# Patient Record
Sex: FEMALE | Race: WHITE | NOT HISPANIC OR LATINO | Employment: PART TIME | URBAN - METROPOLITAN AREA
[De-identification: names, ages, dates, MRNs, and addresses within clinical notes are randomized per-mention and may not be internally consistent; named-entity substitution may affect disease eponyms.]

---
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
## 2020-03-11 ENCOUNTER — TELEPHONE (OUTPATIENT)
Dept: SCHEDULING | Facility: IMAGING CENTER | Age: 20
End: 2020-03-11

## 2020-06-04 ENCOUNTER — OFFICE VISIT (OUTPATIENT)
Dept: MEDICAL GROUP | Facility: PHYSICIAN GROUP | Age: 20
End: 2020-06-04
Payer: COMMERCIAL

## 2020-06-04 ENCOUNTER — HOSPITAL ENCOUNTER (OUTPATIENT)
Facility: MEDICAL CENTER | Age: 20
End: 2020-06-04
Attending: NURSE PRACTITIONER
Payer: COMMERCIAL

## 2020-06-04 VITALS
HEART RATE: 98 BPM | SYSTOLIC BLOOD PRESSURE: 128 MMHG | BODY MASS INDEX: 26.37 KG/M2 | TEMPERATURE: 98.2 F | WEIGHT: 168 LBS | DIASTOLIC BLOOD PRESSURE: 70 MMHG | OXYGEN SATURATION: 96 % | HEIGHT: 67 IN

## 2020-06-04 DIAGNOSIS — Z11.3 ROUTINE SCREENING FOR STI (SEXUALLY TRANSMITTED INFECTION): ICD-10-CM

## 2020-06-04 DIAGNOSIS — Z13.6 SCREENING FOR CARDIOVASCULAR CONDITION: ICD-10-CM

## 2020-06-04 DIAGNOSIS — Z13.228 SCREENING FOR METABOLIC DISORDER: ICD-10-CM

## 2020-06-04 DIAGNOSIS — Z00.00 ROUTINE ADULT HEALTH MAINTENANCE: ICD-10-CM

## 2020-06-04 DIAGNOSIS — Z13.21 ENCOUNTER FOR VITAMIN DEFICIENCY SCREENING: ICD-10-CM

## 2020-06-04 DIAGNOSIS — Z30.013 ENCOUNTER FOR INITIAL PRESCRIPTION OF INJECTABLE CONTRACEPTIVE: ICD-10-CM

## 2020-06-04 DIAGNOSIS — Z23 NEED FOR VACCINATION: ICD-10-CM

## 2020-06-04 LAB
INT CON NEG: POSITIVE
INT CON POS: NEGATIVE
POC URINE PREGNANCY TEST: NORMAL

## 2020-06-04 PROCEDURE — 87491 CHLMYD TRACH DNA AMP PROBE: CPT

## 2020-06-04 PROCEDURE — 99395 PREV VISIT EST AGE 18-39: CPT | Mod: 25 | Performed by: NURSE PRACTITIONER

## 2020-06-04 PROCEDURE — 81025 URINE PREGNANCY TEST: CPT | Performed by: NURSE PRACTITIONER

## 2020-06-04 PROCEDURE — 87591 N.GONORRHOEAE DNA AMP PROB: CPT

## 2020-06-04 RX ORDER — MEDROXYPROGESTERONE ACETATE 150 MG/ML
150 INJECTION, SUSPENSION INTRAMUSCULAR ONCE
Status: COMPLETED | OUTPATIENT
Start: 2020-06-04 | End: 2020-06-04

## 2020-06-04 RX ADMIN — MEDROXYPROGESTERONE ACETATE 150 MG: 150 INJECTION, SUSPENSION INTRAMUSCULAR at 16:08

## 2020-06-04 ASSESSMENT — PATIENT HEALTH QUESTIONNAIRE - PHQ9: CLINICAL INTERPRETATION OF PHQ2 SCORE: 0

## 2020-06-04 NOTE — PATIENT INSTRUCTIONS
Medroxyprogesterone injection [Contraceptive]  What is this medicine?  MEDROXYPROGESTERONE (me DROX ee proe MAXX te fran) contraceptive injections prevent pregnancy. They provide effective birth control for 3 months. Depo-subQ Provera 104 is also used for treating pain related to endometriosis.  This medicine may be used for other purposes; ask your health care provider or pharmacist if you have questions.  COMMON BRAND NAME(S): Depo-Provera, Depo-subQ Provera 104  What should I tell my health care provider before I take this medicine?  They need to know if you have any of these conditions:  -frequently drink alcohol  -asthma  -blood vessel disease or a history of a blood clot in the lungs or legs  -bone disease such as osteoporosis  -breast cancer  -diabetes  -eating disorder (anorexia nervosa or bulimia)  -high blood pressure  -HIV infection or AIDS  -kidney disease  -liver disease  -mental depression  -migraine  -seizures (convulsions)  -stroke  -tobacco smoker  -vaginal bleeding  -an unusual or allergic reaction to medroxyprogesterone, other hormones, medicines, foods, dyes, or preservatives  -pregnant or trying to get pregnant  -breast-feeding  How should I use this medicine?  Depo-Provera Contraceptive injection is given into a muscle. Depo-subQ Provera 104 injection is given under the skin. These injections are given by a health care professional. You must not be pregnant before getting an injection. The injection is usually given during the first 5 days after the start of a menstrual period or 6 weeks after delivery of a baby.  Talk to your pediatrician regarding the use of this medicine in children. Special care may be needed. These injections have been used in female children who have started having menstrual periods.  Overdosage: If you think you have taken too much of this medicine contact a poison control center or emergency room at once.  NOTE: This medicine is only for you. Do not share this medicine  with others.  What if I miss a dose?  Try not to miss a dose. You must get an injection once every 3 months to maintain birth control. If you cannot keep an appointment, call and reschedule it. If you wait longer than 13 weeks between Depo-Provera contraceptive injections or longer than 14 weeks between Depo-subQ Provera 104 injections, you could get pregnant. Use another method for birth control if you miss your appointment. You may also need a pregnancy test before receiving another injection.  What may interact with this medicine?  Do not take this medicine with any of the following medications:  -bosentan  This medicine may also interact with the following medications:  -aminoglutethimide  -antibiotics or medicines for infections, especially rifampin, rifabutin, rifapentine, and griseofulvin  -aprepitant  -barbiturate medicines such as phenobarbital or primidone  -bexarotene  -carbamazepine  -medicines for seizures like ethotoin, felbamate, oxcarbazepine, phenytoin, topiramate  -modafinil  -Gibsonia's wort  This list may not describe all possible interactions. Give your health care provider a list of all the medicines, herbs, non-prescription drugs, or dietary supplements you use. Also tell them if you smoke, drink alcohol, or use illegal drugs. Some items may interact with your medicine.  What should I watch for while using this medicine?  This drug does not protect you against HIV infection (AIDS) or other sexually transmitted diseases.  Use of this product may cause you to lose calcium from your bones. Loss of calcium may cause weak bones (osteoporosis). Only use this product for more than 2 years if other forms of birth control are not right for you. The longer you use this product for birth control the more likely you will be at risk for weak bones. Ask your health care professional how you can keep strong bones.  You may have a change in bleeding pattern or irregular periods. Many females stop having  periods while taking this drug.  If you have received your injections on time, your chance of being pregnant is very low. If you think you may be pregnant, see your health care professional as soon as possible.  Tell your health care professional if you want to get pregnant within the next year. The effect of this medicine may last a long time after you get your last injection.  What side effects may I notice from receiving this medicine?  Side effects that you should report to your doctor or health care professional as soon as possible:  -allergic reactions like skin rash, itching or hives, swelling of the face, lips, or tongue  -breast tenderness or discharge  -breathing problems  -changes in vision  -depression  -feeling faint or lightheaded, falls  -fever  -pain in the abdomen, chest, groin, or leg  -problems with balance, talking, walking  -unusually weak or tired  -yellowing of the eyes or skin  Side effects that usually do not require medical attention (report to your doctor or health care professional if they continue or are bothersome):  -acne  -fluid retention and swelling  -headache  -irregular periods, spotting, or absent periods  -temporary pain, itching, or skin reaction at site where injected  -weight gain  This list may not describe all possible side effects. Call your doctor for medical advice about side effects. You may report side effects to FDA at 3-918-FDA-1648.  Where should I keep my medicine?  This does not apply. The injection will be given to you by a health care professional.  NOTE: This sheet is a summary. It may not cover all possible information. If you have questions about this medicine, talk to your doctor, pharmacist, or health care provider.  © 2018 [employer] (2010-01-08 18:37:56)

## 2020-06-05 LAB
C TRACH DNA SPEC QL NAA+PROBE: NEGATIVE
N GONORRHOEA DNA SPEC QL NAA+PROBE: NEGATIVE
SPECIMEN SOURCE: NORMAL

## 2020-06-05 NOTE — PROGRESS NOTES
Subjective:     CC:   Chief Complaint   Patient presents with   • Establish Care       HPI:   Mackenzie Daniels is a 19 y.o. female who presents for annual exam.  She is not been established with a primary for several years.  Her vaccination record is missing quite a few doses, I have requested that she obtain the physical record that her mom has if possible so we can determine appropriate needs.     Would like to talk about hormonal birth control today.  She is in a monogamous relationship for 4 months with her boyfriend.  They have been living together for the past couple of months.  She sees currently not in school or working.  She is interested in doing the  program at [facility].    Patient has GYN provider: No   Last Pap Smear: Not yet indicated  H/O Abnormal Pap: No  Last Mammogram: Not yet indicated  Last Bone Density Test: Not yet indicated  Last Colorectal Cancer Screening: Not yet indicated  Last Tdap: 2011  Received HPV series: No    Exercise: minimal exercise, one hour walking weekly  Diet: Overall healthy, no major restrictions    No LMP recorded.  Hx STDs: No  Birth control: None currently, barrier contraception and pullout method.  Would like to discuss hormonal birth control today.  Menses every month with 5 days with moderate bleeding.  Reports severe cramping and does take OTC analgesics for cramps.  No significant bloating/fluid retention, pelvic pain, or dyspareunia. No abnormal vaginal discharge.  No breast tenderness, mass, nipple discharge, changes in size or contour, or abnormal cyclic discomfort.    OB History    Para Term  AB Living   0 0 0 0 0 0   SAB TAB Ectopic Molar Multiple Live Births   0 0 0 0 0 0      She  reports being sexually active and has had partner(s) who are Male. She reports using the following methods of birth control/protection: Condom and Coitus Interruptus.    She  has a past medical history of Allergy and Urinary tract infection, site not  "specified.  She  has no past surgical history on file.    Family History   Problem Relation Age of Onset   • Arthritis Mother    • Diabetes Maternal Grandmother    • Heart Disease Maternal Grandmother    • Hypertension Maternal Grandmother    • Hyperlipidemia Maternal Grandmother    • Cancer Maternal Grandmother         cervical     Social History     Tobacco Use   • Smoking status: Never Smoker   • Smokeless tobacco: Never Used   Substance Use Topics   • Alcohol use: No   • Drug use: No       There are no active problems to display for this patient.    Current Outpatient Medications   Medication Sig Dispense Refill   • IBUPROFEN PO Take  by mouth.       No current facility-administered medications for this visit.      Allergies   Allergen Reactions   • Amoxicillin Hives       Review of Systems   Constitutional: Negative for fever, chills and malaise/fatigue.   HENT: Negative for congestion.    Eyes: Negative for pain.   Respiratory: Negative for cough and shortness of breath.    Cardiovascular: Negative for chest pain and leg swelling.   Gastrointestinal: Negative for nausea, vomiting, abdominal pain and diarrhea.   Genitourinary: Negative for dysuria and hematuria.   Skin: Negative for rash.   Neurological: Negative for dizziness, focal weakness and headaches.   Endo/Heme/Allergies: Does not bruise/bleed easily.   Psychiatric/Behavioral: Negative for depression.  The patient is not nervous/anxious.      Objective:   /70 (BP Location: Right arm, Patient Position: Sitting, BP Cuff Size: Adult)   Pulse 98   Temp 36.8 °C (98.2 °F) (Temporal)   Ht 1.702 m (5' 7\")   Wt 76.2 kg (168 lb)   SpO2 96%   BMI 26.31 kg/m²     Wt Readings from Last 4 Encounters:   06/04/20 76.2 kg (168 lb) (91 %, Z= 1.34)*   11/07/19 79.3 kg (174 lb 12.8 oz) (94 %, Z= 1.53)*   04/18/18 83 kg (183 lb) (96 %, Z= 1.75)*   12/02/17 81.6 kg (180 lb) (96 %, Z= 1.72)*     * Growth percentiles are based on CDC (Girls, 2-20 Years) data. " "      Vitals: /70 (BP Location: Right arm, Patient Position: Sitting, BP Cuff Size: Adult)   Pulse 98   Temp 36.8 °C (98.2 °F) (Temporal)   Ht 1.702 m (5' 7\")   Wt 76.2 kg (168 lb)   SpO2 96%   BMI 26.31 kg/m²   General: Alert, cooperative, dressed appropriately for weather / situation  Eyes:Normocephalic.  EOMI, no icterus or pallor.  Conjunctivae clear without erythema / irritation.  ENT:  External ears developed; Bilat TMs visualized; appear pearly without bulging, effusion, or erythema; crisp light reflex.    Lymph: Neck supple, absent of cervical or supraclavicular lymphadenopathy.  Thyroid palpated, free of masses or goiter.   Heart: Regular rate and rhythm.  S1 and S2 normal.  No murmurs auscultated; no murmurs / bruits heard over bilateral carotids.  Bilateral radial pulses strong and equal.  Bilateral posterior tibial pulses strong and equal.  Respiratory: Normal respiratory effort.  Clear to auscultation bilaterally.  AP ratio 1:2   Abdomen: Non-distended;   Skin: Visible skin intact, dry, without rash.  Musculoskeletal: Gait is normal.  Bilateral  strength strong equal.  Moves extremities freely and equally bilaterally  Neuro:  AAOx3 Visual tracking intact, no nystagmus;   Psych:  Affect/mood is normal, judgement is good, memory is intact, grooming is appropriate.        Assessment and Plan:     1. Encounter for initial prescription of injectable contraceptive  Negative pregnancy test obtained.  Advised patient to continue to use barrier contraception for STI prevention.  We will do full STI panel today.    Discussed risk/benefits/alternatives of Depo-Provera injection as well as other forms of birth control including implant, IUD, OCP.  After much discussion, patient verbally consented for the injection.  We will start this today and have patient return for second dose in 12 weeks.  We discussed potential side effects and advised patient to reach out to me via my chart return to the " clinic for any questions or concerns.    - POCT Pregnancy  - medroxyPROGESTERone (DEPO-PROVERA) injection 150 mg    2. Routine adult health maintenance  - CBC WITH DIFFERENTIAL; Future  - Comp Metabolic Panel; Future  - Lipid Profile; Future  - VITAMIN D,25 HYDROXY; Future    3. Screening for cardiovascular condition  - CBC WITH DIFFERENTIAL; Future  - Lipid Profile; Future    4. Encounter for vitamin deficiency screening  - CBC WITH DIFFERENTIAL; Future  - Comp Metabolic Panel; Future  - VITAMIN D,25 HYDROXY; Future    5. Screening for metabolic disorder  - Comp Metabolic Panel; Future    6. Routine screening for STI (sexually transmitted infection)  - T.PALLIDUM AB EIA; Future  - HIV AG/AB COMBO ASSAY SCREENING; Future  - HEP C VIRUS ANTIBODY; Future  - Chlamydia/GC PCR Urine Or Swab; Future    7. Need for vaccination: Patient to return with vaccination record, based on this we will develop a plan to get patient completely caught up on vaccines.      Health maintenance: Due for multiple vaccinations,  Labs per orders  Immunizations per orders  Patient counseled about skin care, diet, supplements, and exercise.  Discussed  breast self exam, STD prevention, use and side effects of OCP's, family planning choices, diet and exercise     Follow-up: Return in about 3 months (around 9/4/2020) for Depo Injection.

## 2020-06-25 LAB
25(OH)D3+25(OH)D2 SERPL-MCNC: 23.6 NG/ML (ref 30–100)
ALBUMIN SERPL-MCNC: 5 G/DL (ref 3.9–5)
ALBUMIN/GLOB SERPL: 1.8 {RATIO} (ref 1.2–2.2)
ALP SERPL-CCNC: 53 IU/L (ref 39–117)
ALT SERPL-CCNC: 17 IU/L (ref 0–32)
AST SERPL-CCNC: 17 IU/L (ref 0–40)
BASOPHILS # BLD AUTO: 0.1 X10E3/UL (ref 0–0.2)
BASOPHILS NFR BLD AUTO: 1 %
BILIRUB SERPL-MCNC: 0.6 MG/DL (ref 0–1.2)
BUN SERPL-MCNC: 10 MG/DL (ref 6–20)
BUN/CREAT SERPL: 12 (ref 9–23)
CALCIUM SERPL-MCNC: 9.5 MG/DL (ref 8.7–10.2)
CHLORIDE SERPL-SCNC: 103 MMOL/L (ref 96–106)
CHOLEST SERPL-MCNC: 232 MG/DL (ref 100–169)
CO2 SERPL-SCNC: 19 MMOL/L (ref 20–29)
CREAT SERPL-MCNC: 0.82 MG/DL (ref 0.57–1)
EOSINOPHIL # BLD AUTO: 0.1 X10E3/UL (ref 0–0.4)
EOSINOPHIL NFR BLD AUTO: 2 %
ERYTHROCYTE [DISTWIDTH] IN BLOOD BY AUTOMATED COUNT: 13.5 % (ref 11.7–15.4)
GLOBULIN SER CALC-MCNC: 2.8 G/DL (ref 1.5–4.5)
GLUCOSE SERPL-MCNC: 87 MG/DL (ref 65–99)
HCT VFR BLD AUTO: 44.8 % (ref 34–46.6)
HCV AB S/CO SERPL IA: <0.1 S/CO RATIO (ref 0–0.9)
HDLC SERPL-MCNC: 41 MG/DL
HGB BLD-MCNC: 15 G/DL (ref 11.1–15.9)
HIV 1+2 AB+HIV1 P24 AG SERPL QL IA: NON REACTIVE
IMM GRANULOCYTES # BLD AUTO: 0 X10E3/UL (ref 0–0.1)
IMM GRANULOCYTES NFR BLD AUTO: 0 %
IMMATURE CELLS  115398: ABNORMAL
LABORATORY COMMENT REPORT: ABNORMAL
LDLC SERPL CALC-MCNC: 156 MG/DL (ref 0–109)
LYMPHOCYTES # BLD AUTO: 3.4 X10E3/UL (ref 0.7–3.1)
LYMPHOCYTES NFR BLD AUTO: 58 %
MCH RBC QN AUTO: 28.2 PG (ref 26.6–33)
MCHC RBC AUTO-ENTMCNC: 33.5 G/DL (ref 31.5–35.7)
MCV RBC AUTO: 84 FL (ref 79–97)
MONOCYTES # BLD AUTO: 0.5 X10E3/UL (ref 0.1–0.9)
MONOCYTES NFR BLD AUTO: 8 %
MORPHOLOGY BLD-IMP: ABNORMAL
NEUTROPHILS # BLD AUTO: 1.8 X10E3/UL (ref 1.4–7)
NEUTROPHILS NFR BLD AUTO: 31 %
NRBC BLD AUTO-RTO: ABNORMAL %
PLATELET # BLD AUTO: 335 X10E3/UL (ref 150–450)
POTASSIUM SERPL-SCNC: 4.1 MMOL/L (ref 3.5–5.2)
PROT SERPL-MCNC: 7.8 G/DL (ref 6–8.5)
RBC # BLD AUTO: 5.32 X10E6/UL (ref 3.77–5.28)
SODIUM SERPL-SCNC: 139 MMOL/L (ref 134–144)
TREPONEMA PALLIDUM IGG+IGM AB [PRESENCE] IN SERUM OR PLASMA BY IMMUNOASSAY: NON REACTIVE
TRIGL SERPL-MCNC: 175 MG/DL (ref 0–89)
VLDLC SERPL CALC-MCNC: 35 MG/DL (ref 5–40)
WBC # BLD AUTO: 5.8 X10E3/UL (ref 3.4–10.8)

## 2020-09-23 ENCOUNTER — PATIENT MESSAGE (OUTPATIENT)
Dept: MEDICAL GROUP | Facility: PHYSICIAN GROUP | Age: 20
End: 2020-09-23

## 2020-11-26 ENCOUNTER — OFFICE VISIT (OUTPATIENT)
Dept: URGENT CARE | Facility: PHYSICIAN GROUP | Age: 20
End: 2020-11-26
Payer: COMMERCIAL

## 2020-11-26 ENCOUNTER — HOSPITAL ENCOUNTER (OUTPATIENT)
Facility: MEDICAL CENTER | Age: 20
End: 2020-11-26
Attending: NURSE PRACTITIONER
Payer: COMMERCIAL

## 2020-11-26 VITALS
DIASTOLIC BLOOD PRESSURE: 64 MMHG | WEIGHT: 169 LBS | RESPIRATION RATE: 16 BRPM | BODY MASS INDEX: 26.53 KG/M2 | OXYGEN SATURATION: 97 % | TEMPERATURE: 97.8 F | SYSTOLIC BLOOD PRESSURE: 112 MMHG | HEIGHT: 67 IN | HEART RATE: 94 BPM

## 2020-11-26 DIAGNOSIS — R51.9 ACUTE NONINTRACTABLE HEADACHE, UNSPECIFIED HEADACHE TYPE: ICD-10-CM

## 2020-11-26 DIAGNOSIS — J06.9 VIRAL URI: ICD-10-CM

## 2020-11-26 DIAGNOSIS — R05.9 COUGH: ICD-10-CM

## 2020-11-26 PROCEDURE — U0003 INFECTIOUS AGENT DETECTION BY NUCLEIC ACID (DNA OR RNA); SEVERE ACUTE RESPIRATORY SYNDROME CORONAVIRUS 2 (SARS-COV-2) (CORONAVIRUS DISEASE [COVID-19]), AMPLIFIED PROBE TECHNIQUE, MAKING USE OF HIGH THROUGHPUT TECHNOLOGIES AS DESCRIBED BY CMS-2020-01-R: HCPCS

## 2020-11-26 PROCEDURE — 99214 OFFICE O/P EST MOD 30 MIN: CPT | Performed by: NURSE PRACTITIONER

## 2020-11-26 ASSESSMENT — ENCOUNTER SYMPTOMS
HEADACHES: 1
FEVER: 0
SORE THROAT: 0
ABNORMAL BEHAVIOR: 0
EYE WATERING: 0
EYE PAIN: 0
BLURRED VISION: 0
COUGH: 1
BACK PAIN: 0
ABDOMINAL PAIN: 0
SINUS PRESSURE: 0
ANOREXIA: 0
NAUSEA: 0
DIZZINESS: 0

## 2020-11-26 ASSESSMENT — FIBROSIS 4 INDEX: FIB4 SCORE: 0.25

## 2020-11-26 NOTE — PROGRESS NOTES
"Subjective:      Mackenzie Daniels is a 20 y.o. female who presents with Headache (nausea, body aches, chills, was sent home from work )        Reviewed past medical, surgical and family history. Reviewed prescription and OTC medications with patient in electronic health record today  Allergies: Amoxicillin            Headache   This is a new problem. The current episode started in the past 7 days. The problem occurs constantly. The problem has been unchanged. The pain is located in the bilateral region. The pain does not radiate. The pain quality is similar to prior headaches. The quality of the pain is described as aching and dull. The pain is at a severity of 3/10. The pain is mild. Associated symptoms include coughing. Pertinent negatives include no abdominal pain, abnormal behavior, anorexia, back pain, blurred vision, dizziness, eye pain, eye watering, fever, nausea, sinus pressure, sore throat or tinnitus. Associated symptoms comments: Chills, Body aches  . Nothing aggravates the symptoms. She has tried nothing for the symptoms.       Review of Systems   Constitutional: Negative for fever.   HENT: Negative for sinus pressure, sore throat and tinnitus.    Eyes: Negative for blurred vision and pain.   Respiratory: Positive for cough.    Gastrointestinal: Negative for abdominal pain, anorexia and nausea.   Musculoskeletal: Negative for back pain.   Neurological: Positive for headaches. Negative for dizziness.          Objective:     /64 (BP Location: Right arm, Patient Position: Sitting, BP Cuff Size: Adult)   Pulse 94   Temp 36.6 °C (97.8 °F) (Temporal)   Resp 16   Ht 1.702 m (5' 7\")   Wt 76.7 kg (169 lb)   SpO2 97%   BMI 26.47 kg/m²      Physical Exam  Vitals signs and nursing note reviewed.   Constitutional:       General: She is not in acute distress.     Appearance: Normal appearance. She is well-developed, well-groomed and normal weight. She is not ill-appearing or toxic-appearing.   HENT:      " Head: Normocephalic.      Mouth/Throat:      Mouth: Mucous membranes are moist.      Pharynx: No oropharyngeal exudate or posterior oropharyngeal erythema (mild).   Eyes:      General: Lids are normal.      Extraocular Movements: Extraocular movements intact.      Conjunctiva/sclera: Conjunctivae normal.   Neck:      Musculoskeletal: Full passive range of motion without pain, normal range of motion and neck supple. No neck rigidity.   Cardiovascular:      Rate and Rhythm: Normal rate and regular rhythm.      Pulses: Normal pulses.      Heart sounds: Normal heart sounds.   Pulmonary:      Effort: Pulmonary effort is normal. No accessory muscle usage or respiratory distress.      Breath sounds: Normal breath sounds and air entry. No rhonchi.   Musculoskeletal: Normal range of motion.      Right lower leg: No edema.      Left lower leg: No edema.   Lymphadenopathy:      Cervical: No cervical adenopathy.      Upper Body:      Right upper body: No supraclavicular adenopathy.      Left upper body: No supraclavicular adenopathy.   Skin:     General: Skin is warm and dry.      Capillary Refill: Capillary refill takes less than 2 seconds.      Findings: No rash.   Neurological:      General: No focal deficit present.      Mental Status: She is alert and oriented to person, place, and time.      Coordination: Coordination is intact.   Psychiatric:         Attention and Perception: Attention normal.         Mood and Affect: Mood and affect normal.         Speech: Speech normal.         Behavior: Behavior normal. Behavior is cooperative.         Thought Content: Thought content normal.         Judgment: Judgment normal.                 Assessment/Plan:        1. Viral URI  COVID/SARS COV-2 PCR   2. Cough     3. Acute nonintractable headache, unspecified headache type         COVID testing - pending  Self Quarantine per CDC guidelines  Educated in infection control practices.   Discussed that this illness was viral in nature.  Did not see any evidence of a bacterial process.   OTC  analgesic of choice ( acetaminophen or NSAID) . Follow manufactures dosing and safety precautions.   Keep well hydrated  Increase rest  Return to urgent care clinic or PCP prn  if current symptoms are not resolving in a satisfactory manner or sooner if new or worsening symptoms occur.   Differential diagnosis, natural history, supportive care, and indications for immediate follow-up. Advised of signs and symptoms which would warrant further evaluation and /or emergent evaluation in ER.    Verbalized agreement with this treatment plan and seemed to understand without barriers. Questions were encouraged and answered to satisfaction.

## 2020-11-26 NOTE — LETTER
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 A concern for COVID-19 has been identified and testing is in progress.  We are asking you to excuse absences while following self-isolation protocol per Center for Disease Control (CDC) guidelines. Your employee will be able to access test results through our electronic delivery system called [employer].  You can be around others after:          - 10 days since symptoms first appeared and        - 24 hours with no fever without the use of fever-reducing medications and        - Other symptoms of COVID-19 are improving*           *Loss of taste and smell may persist for weeks or months after recovery and need not delay the end of isolation   The guidelines are from the CDC and apply even if you have tested negative for  COVID-19 infection.   You can contact the [facility] at [phone] or [facility] [phone] if you have questions.    If the results of testing are positive then your employee will be contacted by the [facility] or [facility] for further instructions on duration of self-isolation and return to work protocol. In general, this will also follow the CDC guidelines.   In general, repeat testing is not necessary and not offered through our [facility] care.    This is the only note that will be provided from [facility] for this visit. Your employee will require an appointment with a primary care provider if FMLA or disability forms are required.     Sincerely,        AMBIKA Maciel.  Electronically Signed

## 2020-11-26 NOTE — PATIENT INSTRUCTIONS
Viral syndrome and Novel Coronavirus (COVID-19)       You have a viral syndrome which may include symptoms like muscle aches, fevers, chills, runny nose, cough, sneezing, sore throat, vomiting or diarrhea.  One of the potential viruses you may have is SARSCoV-2, the virus that causes COVID-19, also known as the novel coronavirus.  You may be just as likely to have a different viral infection such as the common cold or flu.  Most patients with COVID -19 have mild symptoms and recover on their own. Resting, staying hydrated, and sleeping are typically helpful.  As of today's visit, you are well enough to go home and treat your symptoms with oral fluids, medicines for fevers, cough, pain, etc.        COVID 19 testing is not performed on most people with mild symptoms who are being discharged from the Emergency Department or Clinic.      If COVID 19 testing was performed, the results will not be available for up to 4 days.  Please DO NOT CONTACT THE EMERGENCY DEPARTMENT OR CLINIC FOR RESULTS OF THIS TEST.       You will be contacted by a member of the [facility] team with your results and for further discussion.       Please follow the precautions below:      • Stay home except to get medical care.   • As advised by the Centers for Disease Control and Prevention (CDC), we recommend you stay in your home and minimize contact with others to avoid spreading this infection.   • The elderly or anyone with significant medical issues may have more severe symptoms from this infection. We recommend separation, also known as self-isolation, for at least 7 days after your first day of symptoms and several more after that if you are still sick. The most important action is wait for at least  a week and several more days after you feel well before returning to you regular activities, work or school. If you become sicker, like difficulty breathing, chest pain, you are unable to eat or drink enough, or have severe vomiting,  "diarrhea or weakness, you may need to return to the Emergency Department or contact your clinic provider for re-evaluation.    • You should restrict activities outside your home, except for getting medical care. Do not go to work, school, or public areas. Avoid using public transportation, ride-sharing, or taxis.   • Separate yourself from other people and animals in your home.   • As much as possible, you should stay in a specific room and away from other people in your home. Also, you should use a separate bathroom, if available.   • Avoid sharing personal household items. You should not share dishes, drinking glasses, cups, eating utensils, towels, or bedding with other people in your home. After using these items, they should be washed thoroughly with soap and water.         Precautions continued:    • Clean all \"high-touch\" surfaces every day high touch surfaces include counters, tabletops, doorknobs, bathroom fixtures, toilets, phones, keyboards, tablets, and bedside tables. Also, clean any surfaces that may have blood, stool, or body fluids on them. Use a household cleaning   spray or wipe, according to the label instructions. Labels contain instructions for safe and effective use of the cleaning product including precautions you should take when applying the product, such as wearing gloves and making sure you have good ventilation during use of the product.   • Clean your hands often. Wash your hands often with soap and water for at least 20 seconds. If soap and water are not available, clean your hands with an alcohol-based hand  that contains at least 60% alcohol, covering all surfaces of your hands and rubbing them together until they feel dry. Soap and water should be used preferentially if hands are visibly dirty. Avoid touching your eyes, nose, and mouth with unwashed hands.   • Cover your coughs and sneezes    • Cover your mouth and nose with a tissue when you cough or sneeze   • Throw used " tissues in a lined trash can; immediately wash your hands with soap and water for at least 20 seconds or clean your hands with an alcohol-based hand  that contains at least 60 to 95% alcohol, covering all surfaces of your hands and rubbing them together until they feel dry. Soap and water should be used preferentially if hands are visibly dirty.     • When seeking care at a healthcare facility:    · Seek prompt medical attention if your illness is worsening (e.g., difficulty breathing).    · Put on a facemask before you enter the facility.    · These steps will help the healthcare provider's office to keep other people in the office or waiting room from getting infected or exposed.    · If possible, put on a facemask before emergency medical services arrive.      Please see the resources below for more information.      CDC Corona Website https://www.cdc.gov/coronavirus/2019-ncov/index.html    General Information https://www.cdc.gov/coronavirus/2019-ncov/faq.html      [facility]: [phone]     [facility] Line [phone]

## 2020-11-28 LAB
COVID ORDER STATUS COVID19: NORMAL
SARS-COV-2 RNA RESP QL NAA+PROBE: NOTDETECTED
SPECIMEN SOURCE: NORMAL

## 2021-08-16 SDOH — HEALTH STABILITY: PHYSICAL HEALTH: ON AVERAGE, HOW MANY DAYS PER WEEK DO YOU ENGAGE IN MODERATE TO STRENUOUS EXERCISE (LIKE A BRISK WALK)?: 3 DAYS

## 2021-08-16 SDOH — ECONOMIC STABILITY: HOUSING INSECURITY
IN THE LAST 12 MONTHS, WAS THERE A TIME WHEN YOU DID NOT HAVE A STEADY PLACE TO SLEEP OR SLEPT IN A SHELTER (INCLUDING NOW)?: NO

## 2021-08-16 SDOH — ECONOMIC STABILITY: FOOD INSECURITY: WITHIN THE PAST 12 MONTHS, THE FOOD YOU BOUGHT JUST DIDN'T LAST AND YOU DIDN'T HAVE MONEY TO GET MORE.: NEVER TRUE

## 2021-08-16 SDOH — HEALTH STABILITY: PHYSICAL HEALTH: ON AVERAGE, HOW MANY MINUTES DO YOU ENGAGE IN EXERCISE AT THIS LEVEL?: 60 MIN

## 2021-08-16 SDOH — ECONOMIC STABILITY: FOOD INSECURITY: WITHIN THE PAST 12 MONTHS, YOU WORRIED THAT YOUR FOOD WOULD RUN OUT BEFORE YOU GOT MONEY TO BUY MORE.: NEVER TRUE

## 2021-08-16 SDOH — ECONOMIC STABILITY: INCOME INSECURITY: HOW HARD IS IT FOR YOU TO PAY FOR THE VERY BASICS LIKE FOOD, HOUSING, MEDICAL CARE, AND HEATING?: PATIENT DECLINED

## 2021-08-16 SDOH — ECONOMIC STABILITY: HOUSING INSECURITY
IN THE LAST 12 MONTHS, WAS THERE A TIME WHEN YOU DID NOT HAVE A STEADY PLACE TO SLEEP OR SLEPT IN A SHELTER (INCLUDING NOW)?: PATIENT REFUSED

## 2021-08-16 SDOH — ECONOMIC STABILITY: TRANSPORTATION INSECURITY
IN THE PAST 12 MONTHS, HAS THE LACK OF TRANSPORTATION KEPT YOU FROM MEDICAL APPOINTMENTS OR FROM GETTING MEDICATIONS?: PATIENT DECLINED

## 2021-08-16 SDOH — ECONOMIC STABILITY: TRANSPORTATION INSECURITY
IN THE PAST 12 MONTHS, HAS LACK OF RELIABLE TRANSPORTATION KEPT YOU FROM MEDICAL APPOINTMENTS, MEETINGS, WORK OR FROM GETTING THINGS NEEDED FOR DAILY LIVING?: PATIENT DECLINED

## 2021-08-16 SDOH — ECONOMIC STABILITY: TRANSPORTATION INSECURITY
IN THE PAST 12 MONTHS, HAS LACK OF TRANSPORTATION KEPT YOU FROM MEETINGS, WORK, OR FROM GETTING THINGS NEEDED FOR DAILY LIVING?: PATIENT DECLINED

## 2021-08-16 SDOH — ECONOMIC STABILITY: INCOME INSECURITY: IN THE LAST 12 MONTHS, WAS THERE A TIME WHEN YOU WERE NOT ABLE TO PAY THE MORTGAGE OR RENT ON TIME?: PATIENT REFUSED

## 2021-08-16 SDOH — HEALTH STABILITY: MENTAL HEALTH
STRESS IS WHEN SOMEONE FEELS TENSE, NERVOUS, ANXIOUS, OR CAN'T SLEEP AT NIGHT BECAUSE THEIR MIND IS TROUBLED. HOW STRESSED ARE YOU?: RATHER MUCH

## 2021-08-16 SDOH — ECONOMIC STABILITY: HOUSING INSECURITY: IN THE LAST 12 MONTHS, HOW MANY PLACES HAVE YOU LIVED?: 1

## 2021-08-16 ASSESSMENT — LIFESTYLE VARIABLES
HOW OFTEN DO YOU HAVE SIX OR MORE DRINKS ON ONE OCCASION: NEVER
HOW OFTEN DO YOU HAVE A DRINK CONTAINING ALCOHOL: NEVER
HOW MANY STANDARD DRINKS CONTAINING ALCOHOL DO YOU HAVE ON A TYPICAL DAY: PATIENT DECLINED

## 2021-08-16 ASSESSMENT — SOCIAL DETERMINANTS OF HEALTH (SDOH)
DO YOU BELONG TO ANY CLUBS OR ORGANIZATIONS SUCH AS CHURCH GROUPS UNIONS, FRATERNAL OR ATHLETIC GROUPS, OR SCHOOL GROUPS?: NO
HOW MANY DRINKS CONTAINING ALCOHOL DO YOU HAVE ON A TYPICAL DAY WHEN YOU ARE DRINKING: PATIENT DECLINED
HOW OFTEN DO YOU GET TOGETHER WITH FRIENDS OR RELATIVES?: ONCE A WEEK
HOW OFTEN DO YOU GET TOGETHER WITH FRIENDS OR RELATIVES?: ONCE A WEEK
DO YOU BELONG TO ANY CLUBS OR ORGANIZATIONS SUCH AS CHURCH GROUPS UNIONS, FRATERNAL OR ATHLETIC GROUPS, OR SCHOOL GROUPS?: NO
WITHIN THE PAST 12 MONTHS, YOU WORRIED THAT YOUR FOOD WOULD RUN OUT BEFORE YOU GOT THE MONEY TO BUY MORE: NEVER TRUE
HOW HARD IS IT FOR YOU TO PAY FOR THE VERY BASICS LIKE FOOD, HOUSING, MEDICAL CARE, AND HEATING?: PATIENT DECLINED
HOW OFTEN DO YOU HAVE A DRINK CONTAINING ALCOHOL: NEVER
HOW OFTEN DO YOU ATTEND CHURCH OR RELIGIOUS SERVICES?: 1 TO 4 TIMES PER YEAR
HOW OFTEN DO YOU ATTENT MEETINGS OF THE CLUB OR ORGANIZATION YOU BELONG TO?: NEVER
IN A TYPICAL WEEK, HOW MANY TIMES DO YOU TALK ON THE PHONE WITH FAMILY, FRIENDS, OR NEIGHBORS?: TWICE A WEEK
HOW OFTEN DO YOU ATTEND CHURCH OR RELIGIOUS SERVICES?: 1 TO 4 TIMES PER YEAR
HOW OFTEN DO YOU HAVE SIX OR MORE DRINKS ON ONE OCCASION: NEVER
IN A TYPICAL WEEK, HOW MANY TIMES DO YOU TALK ON THE PHONE WITH FAMILY, FRIENDS, OR NEIGHBORS?: TWICE A WEEK
HOW OFTEN DO YOU ATTENT MEETINGS OF THE CLUB OR ORGANIZATION YOU BELONG TO?: NEVER

## 2021-08-20 ENCOUNTER — OFFICE VISIT (OUTPATIENT)
Dept: MEDICAL GROUP | Facility: PHYSICIAN GROUP | Age: 21
End: 2021-08-20
Payer: COMMERCIAL

## 2021-08-20 VITALS
SYSTOLIC BLOOD PRESSURE: 112 MMHG | HEIGHT: 67 IN | OXYGEN SATURATION: 96 % | TEMPERATURE: 98.9 F | WEIGHT: 178.4 LBS | DIASTOLIC BLOOD PRESSURE: 74 MMHG | BODY MASS INDEX: 28 KG/M2 | HEART RATE: 120 BPM

## 2021-08-20 DIAGNOSIS — N94.10 DYSPAREUNIA IN FEMALE: ICD-10-CM

## 2021-08-20 DIAGNOSIS — N94.6 DYSMENORRHEA: ICD-10-CM

## 2021-08-20 DIAGNOSIS — F41.9 ANXIETY: ICD-10-CM

## 2021-08-20 DIAGNOSIS — F32.A DEPRESSION, UNSPECIFIED DEPRESSION TYPE: ICD-10-CM

## 2021-08-20 PROCEDURE — 99214 OFFICE O/P EST MOD 30 MIN: CPT | Performed by: STUDENT IN AN ORGANIZED HEALTH CARE EDUCATION/TRAINING PROGRAM

## 2021-08-20 RX ORDER — NORELGESTROMIN AND ETHINYL ESTRADIOL 35; 150 UG/MG; UG/MG
1 PATCH TRANSDERMAL
Qty: 4 PATCH | Refills: 3 | Status: SHIPPED | OUTPATIENT
Start: 2021-08-20 | End: 2021-10-12

## 2021-08-20 ASSESSMENT — ANXIETY QUESTIONNAIRES
5. BEING SO RESTLESS THAT IT IS HARD TO SIT STILL: NOT AT ALL
3. WORRYING TOO MUCH ABOUT DIFFERENT THINGS: MORE THAN HALF THE DAYS
GAD7 TOTAL SCORE: 9
7. FEELING AFRAID AS IF SOMETHING AWFUL MIGHT HAPPEN: NOT AT ALL
4. TROUBLE RELAXING: MORE THAN HALF THE DAYS
6. BECOMING EASILY ANNOYED OR IRRITABLE: MORE THAN HALF THE DAYS
1. FEELING NERVOUS, ANXIOUS, OR ON EDGE: MORE THAN HALF THE DAYS
2. NOT BEING ABLE TO STOP OR CONTROL WORRYING: SEVERAL DAYS

## 2021-08-20 ASSESSMENT — PATIENT HEALTH QUESTIONNAIRE - PHQ9
CLINICAL INTERPRETATION OF PHQ2 SCORE: 2
SUM OF ALL RESPONSES TO PHQ QUESTIONS 1-9: 11
5. POOR APPETITE OR OVEREATING: 1 - SEVERAL DAYS

## 2021-08-20 ASSESSMENT — FIBROSIS 4 INDEX: FIB4 SCORE: 0.25

## 2021-08-20 NOTE — PROGRESS NOTES
"Subjective:     CC: painful periods and establish care    HISTORY OF THE PRESENT ILLNESS: Patient is a 20 y.o. female. This pleasant patient is here today to establish care and discuss painful menses.    Primary dysmenorrhea: Chronic. Has painful BM and diarrhea during periods. Reports pain with intercourse. Reports the first day is heavy (changes pads Q4 hours and not filled), lasts about 4-5 days. Monthly. Was told in the past to take naproxen, drink fluids and use a heating pad. Misses work the first few days, pain gets better by the 3rd day. Tried depo provera last year and had HA, irregular bleeding and nausea. Reports pain only with ovulation (back and stomach cramps) other than during menses.    Regarding her chronic anxiety and depression, she reports she was seeing a counselor prior but would like to also establish with a psychiatrist. She states her brother was murdered and she believes she may also have PTSD from that. She's never been on medication. No SI/HI.    Medications: Naproxen prn     Health Maintenance: Will request records. Patient declines urine GC/CT testing today, ok to obtain with pap at 22yo.    ROS:   See HPI      Objective:     Exam: /74 (BP Location: Left arm, Patient Position: Sitting, BP Cuff Size: Adult)   Pulse (!) 120   Temp 37.2 °C (98.9 °F) (Temporal)   Ht 1.702 m (5' 7\")   Wt 80.9 kg (178 lb 6.4 oz)   SpO2 96%  Body mass index is 27.94 kg/m².    General: Well developed, well nourished in no acute distress.  Head: Normocephalic and atraumatic.  Eyes: Conjunctivae and extraocular motions are normal. Pupils are equal, round. No scleral icterus.   Ears:  External ears unremarkable. Tympanic membranes clear and intact.  Nose: Nares patent. No discharge.  Mouth/Throat: Oropharynx is clear and moist. Posterior pharynx without erythema or exudates.  Neck: Supple. Thyroid is not enlarged.  Pulmonary: Clear to ausculation.  Normal effort. No rales, ronchi, or " wheezing.  Cardiovascular: Regular rate and rhythm without murmur.  Abdomen: Soft, nondistended. Mild lower abdominal ttp. Normal bowel sounds. No HSM.  : Deferred at patient request  Neurologic: No gross/focal deficits. Normal gait.   Lymph: No cervical or supraclavicular lymph nodes are palpable  Skin: Warm and dry.  No obvious lesions.  Musculoskeletal: No extremity cyanosis, clubbing, or edema.  Psych: Normal mood and affect. Alert and oriented x3. Judgment and insight is normal.    Assessment & Plan:   20 y.o. female with the following -    1. Dysmenorrhea  This is a chronic condition, history is suggestive of endometriosis. Patient elects to defer exam to when she is due for pap, in the meantime will obtain pelvis US. No contraindications to combined OCP, reviewed options and patient elects trial of ortho evra as below. May consider option where she can skip menses especially if endometriosis likely. Ok to continue naproxen, rec to start the day prior to menses and keep on scheduled basis for first few days.  - US-PELVIC TRANSVAGINAL ONLY; Future  - norelgestromin-ethinyl estradiol (ORTHO EVRA) 150-35 MCG/24HR patch; Place 1 Patch on the skin every 7 days.  Dispense: 4 Patch; Refill: 3    2. Dyspareunia in female  Chronic condition. Plan for exam with PAP at 22yo in October, plan as above and will obtain pelvic US in the meantime.  - US-PELVIC TRANSVAGINAL ONLY; Future    3. Anxiety  4. Depression, unspecified depression type  These are chronic conditions. We made a f/u to discuss treatment options including medication, wanted referrals as below. No safety concerns.   - REFERRAL TO PSYCHIATRY  - REFERRAL TO PSYCHOLOGY    Return if symptoms worsen or fail to improve, for anxiety/depression.    Please note that this dictation was created using voice recognition software. I have made every reasonable attempt to correct obvious errors, but I expect that there are errors of grammar and possibly content that I did  not discover before finalizing the note.

## 2021-09-14 ENCOUNTER — APPOINTMENT (OUTPATIENT)
Dept: RADIOLOGY | Facility: IMAGING CENTER | Age: 21
End: 2021-09-14
Attending: FAMILY MEDICINE
Payer: COMMERCIAL

## 2021-09-14 ENCOUNTER — OFFICE VISIT (OUTPATIENT)
Dept: URGENT CARE | Facility: PHYSICIAN GROUP | Age: 21
End: 2021-09-14
Payer: COMMERCIAL

## 2021-09-14 VITALS
WEIGHT: 170 LBS | DIASTOLIC BLOOD PRESSURE: 62 MMHG | OXYGEN SATURATION: 97 % | BODY MASS INDEX: 26.68 KG/M2 | RESPIRATION RATE: 16 BRPM | TEMPERATURE: 97.6 F | SYSTOLIC BLOOD PRESSURE: 116 MMHG | HEART RATE: 89 BPM | HEIGHT: 67 IN

## 2021-09-14 DIAGNOSIS — M77.50 ANKLE TENDINITIS: ICD-10-CM

## 2021-09-14 PROCEDURE — 73610 X-RAY EXAM OF ANKLE: CPT | Mod: TC,LT | Performed by: FAMILY MEDICINE

## 2021-09-14 PROCEDURE — 99214 OFFICE O/P EST MOD 30 MIN: CPT | Performed by: FAMILY MEDICINE

## 2021-09-14 ASSESSMENT — FIBROSIS 4 INDEX: FIB4 SCORE: 0.25

## 2021-09-14 NOTE — LETTER
September 14, 2021         Patient: Mackenzie Daniels   YOB: 2000   Date of Visit: 9/14/2021           To Whom it May Concern:    Mackenzie Daniels was seen in my clinic on 9/14/2021. She may return to work in 3 days.    If you have any questions or concerns, please don't hesitate to call.        Sincerely,           Shabbir Gonzalez M.D.  Electronically Signed

## 2021-09-14 NOTE — PROGRESS NOTES
"Subjective     Mackenzie Daniels is a 20 y.o. female who presents with Ankle Pain (L ankle, pain, swelling, painful to put pressure/walk, x1 week )      - This is a pleasant and nontoxic appearing 20 y.o. female with c/o Lt ankle pain for ~ 1 wk. No specific injury but says does stand/walk a lot for work. Pain worse w/ walking, better rest       ALLERGIES:  Amoxicillin     PMH:  Past Medical History:   Diagnosis Date   • Allergy     seasonal   • Anxiety    • Arthritis     knees   • Depression    • Syncope     cards/neuro eval normal per patient   • Urinary tract infection, site not specified         PSH:  History reviewed. No pertinent surgical history.    MEDS:    Current Outpatient Medications:   •  norelgestromin-ethinyl estradiol (ORTHO EVRA) 150-35 MCG/24HR patch, Place 1 Patch on the skin every 7 days., Disp: 4 Patch, Rfl: 3    ** I have documented what I find to be significant in regards to past medical, social, family and surgical history  in my HPI or under PMH/PSH/FH review section, otherwise it is noncontributory **           HPI    Review of Systems   Musculoskeletal: Positive for joint pain.   All other systems reviewed and are negative.             Objective     /62 (BP Location: Right arm, Patient Position: Sitting, BP Cuff Size: Adult)   Pulse 89   Temp 36.4 °C (97.6 °F) (Temporal)   Resp 16   Ht 1.702 m (5' 7\")   Wt 77.1 kg (170 lb)   SpO2 97%   BMI 26.63 kg/m²      Physical Exam  Vitals and nursing note reviewed.   Constitutional:       General: She is not in acute distress.     Appearance: Normal appearance. She is well-developed.   HENT:      Head: Normocephalic and atraumatic.   Eyes:      General: No scleral icterus.  Cardiovascular:      Heart sounds: Normal heart sounds. No murmur heard.     Pulmonary:      Effort: Pulmonary effort is normal. No respiratory distress.   Musculoskeletal:        Feet:    Feet:      Comments: Lt ankle: no edema, wounds or discoloration. Good SROM, NVI. " TTP over marked areas.   Skin:     Coloration: Skin is not jaundiced or pale.   Neurological:      Mental Status: She is alert.      Motor: No abnormal muscle tone.   Psychiatric:         Mood and Affect: Mood normal.         Behavior: Behavior normal.              Assessment & Plan          1. Ankle tendinitis  DX-ANKLE 3+ VIEWS LEFT    REFERRAL TO PODIATRY       - Dx, plan & d/c instructions discussed   - Rest, stay hydrated, OTC Motrin and/or Tylenol as needed  - E.R. precautions discussed     Asked to kindly follow up with their PCP's office in 2-3 days for a recheck, ER if not improving or feeling/getting worse.    Any realistic side effects of medications that may have been given today reviewed.     Patient left in stable condition       Radiology imaging readings reviewed/discussed

## 2021-09-15 ENCOUNTER — HOSPITAL ENCOUNTER (OUTPATIENT)
Dept: RADIOLOGY | Facility: MEDICAL CENTER | Age: 21
End: 2021-09-15
Attending: STUDENT IN AN ORGANIZED HEALTH CARE EDUCATION/TRAINING PROGRAM
Payer: COMMERCIAL

## 2021-09-15 DIAGNOSIS — N94.6 DYSMENORRHEA: ICD-10-CM

## 2021-09-15 DIAGNOSIS — N94.10 DYSPAREUNIA IN FEMALE: ICD-10-CM

## 2021-09-15 PROCEDURE — 76830 TRANSVAGINAL US NON-OB: CPT

## 2021-10-01 ENCOUNTER — OFFICE VISIT (OUTPATIENT)
Dept: URGENT CARE | Facility: PHYSICIAN GROUP | Age: 21
End: 2021-10-01
Payer: COMMERCIAL

## 2021-10-01 VITALS
SYSTOLIC BLOOD PRESSURE: 110 MMHG | DIASTOLIC BLOOD PRESSURE: 70 MMHG | RESPIRATION RATE: 18 BRPM | WEIGHT: 170 LBS | TEMPERATURE: 97.2 F | HEIGHT: 67 IN | BODY MASS INDEX: 26.68 KG/M2 | HEART RATE: 80 BPM | OXYGEN SATURATION: 97 %

## 2021-10-01 DIAGNOSIS — R11.0 NAUSEA: ICD-10-CM

## 2021-10-01 DIAGNOSIS — Z78.9 USES BIRTH CONTROL: ICD-10-CM

## 2021-10-01 DIAGNOSIS — N94.6 MENSTRUAL CRAMPS: ICD-10-CM

## 2021-10-01 PROCEDURE — 99213 OFFICE O/P EST LOW 20 MIN: CPT | Performed by: NURSE PRACTITIONER

## 2021-10-01 RX ORDER — ONDANSETRON 4 MG/1
4 TABLET, FILM COATED ORAL EVERY 4 HOURS PRN
Qty: 20 TABLET | Refills: 0 | Status: SHIPPED | OUTPATIENT
Start: 2021-10-01 | End: 2021-10-07

## 2021-10-01 RX ORDER — NAPROXEN 500 MG/1
500 TABLET ORAL 2 TIMES DAILY WITH MEALS
Qty: 60 TABLET | Refills: 0 | Status: SHIPPED | OUTPATIENT
Start: 2021-10-01 | End: 2021-10-12

## 2021-10-01 ASSESSMENT — ENCOUNTER SYMPTOMS
CONSTITUTIONAL NEGATIVE: 1
EYES NEGATIVE: 1
RESPIRATORY NEGATIVE: 1
HEADACHES: 1
PSYCHIATRIC NEGATIVE: 1
CARDIOVASCULAR NEGATIVE: 1
NAUSEA: 1
ABDOMINAL PAIN: 1
MUSCULOSKELETAL NEGATIVE: 1

## 2021-10-01 ASSESSMENT — FIBROSIS 4 INDEX: FIB4 SCORE: 0.25

## 2021-10-01 NOTE — PROGRESS NOTES
"Subjective:   Mackenzie Daniels is a 20 y.o. female who presents for Migraine (Migrane, stomach cramps. started wed. )       HPI  Pt presents for evaluation of a new problem, reports 3-day history of migraine and stomach cancer.  Patient recently started a new birth control, and states that this is typically her reaction with starting a new birth control.  Patient was on Depo-Provera in the past, and had the same symptoms over 3 months.  Patient was traveling the patch in hopes that this same symptoms would not recur.  Since beginning of her symptoms, patient has taken off the topical birth control patch.    Review of Systems   Constitutional: Negative.    HENT: Negative.    Eyes: Negative.    Respiratory: Negative.    Cardiovascular: Negative.    Gastrointestinal: Positive for abdominal pain (cramping) and nausea.   Genitourinary: Negative.    Musculoskeletal: Negative.    Skin: Negative.    Neurological: Positive for headaches.   Psychiatric/Behavioral: Negative.    All other systems reviewed and are negative.      MEDS:   Current Outpatient Medications:   •  norelgestromin-ethinyl estradiol (ORTHO EVRA) 150-35 MCG/24HR patch, Place 1 Patch on the skin every 7 days., Disp: 4 Patch, Rfl: 3  ALLERGIES:   Allergies   Allergen Reactions   • Amoxicillin Hives       Patient's PMH, SocHx, SurgHx, FamHx, Drug allergies and medications were reviewed.     Objective:   /70   Pulse 80   Temp 36.2 °C (97.2 °F) (Temporal)   Resp 18   Ht 1.702 m (5' 7\")   Wt 77.1 kg (170 lb)   SpO2 97%   BMI 26.63 kg/m²     Physical Exam  Vitals and nursing note reviewed.   Constitutional:       General: She is awake.      Appearance: Normal appearance. She is well-developed.   HENT:      Head: Normocephalic and atraumatic.      Right Ear: External ear normal.      Left Ear: External ear normal.      Nose: Nose normal.      Mouth/Throat:      Mouth: Mucous membranes are moist.      Pharynx: Oropharynx is clear.   Eyes:      Extraocular " Movements: Extraocular movements intact.      Conjunctiva/sclera: Conjunctivae normal.   Cardiovascular:      Rate and Rhythm: Normal rate and regular rhythm.   Pulmonary:      Effort: Pulmonary effort is normal.      Breath sounds: Normal breath sounds.   Abdominal:      General: Abdomen is flat. Bowel sounds are normal.      Tenderness: There is no abdominal tenderness.   Musculoskeletal:         General: Normal range of motion.      Cervical back: Normal range of motion and neck supple.   Skin:     General: Skin is warm and dry.   Neurological:      Mental Status: She is alert and oriented to person, place, and time.   Psychiatric:         Mood and Affect: Mood normal.         Behavior: Behavior normal.         Thought Content: Thought content normal.         Assessment/Plan:   Assessment    1. Nausea  - ondansetron (ZOFRAN) 4 MG Tab tablet; Take 1 Tablet by mouth every four hours as needed for Nausea/Vomiting for up to 20 doses.  Dispense: 20 Tablet; Refill: 0    2. Menstrual cramps  - naproxen (NAPROSYN) 500 MG Tab; Take 1 Tablet by mouth 2 times a day with meals.  Dispense: 60 Tablet; Refill: 0    3. Uses birth control    Vital signs stable at today's acute urgent care visit.  Begin medications as listed. Discussed management options (risks, benefits, and alternatives to treatment).  Patient has annual exam next week with primary care provider for Pap, she will discuss with her contraceptive options at that time.    Advised the patient to follow-up with the primary care provider for recheck, reevaluation, and/or consideration of further management if necessary. Return to urgent care with any worsening symptoms or if there is no improvement in their current condition. Red flags discussed and indications to immediately call 911 or present to the ED.  All questions were encouraged and answered to the patient's satisfaction and understanding, and they agree to the plan of care.     I personally reviewed prior  external notes and test results pertinent to today's visit.  I have independently reviewed and interpreted all diagnostics ordered during this urgent care acute visit. Time spent evaluating this patient was a minimum of 30 minutes and includes preparing for visit, counseling/education, exam, evaluation, obtaining history, and ordering lab/test/procedures.      Please note that this dictation was created using voice recognition software. I have made a reasonable attempt to correct obvious errors, but I expect that there are errors of grammar and possibly content that I did not discover before finalizing the note.

## 2021-10-01 NOTE — LETTER
October 1, 2021         Patient: Mackenzie Daniels   YOB: 2000   Date of Visit: 10/1/2021           To Whom it May Concern:    Mackenzie Daniels was seen in my clinic on 10/1/2021. Please excuse her absences on the dates of 9/30 and 10/1 due to an acute non-contagious illness.    If you have any questions or concerns, please don't hesitate to call.        Sincerely,           MURPHY Mena.  Electronically Signed

## 2021-10-12 ENCOUNTER — HOSPITAL ENCOUNTER (OUTPATIENT)
Facility: MEDICAL CENTER | Age: 21
End: 2021-10-12
Attending: STUDENT IN AN ORGANIZED HEALTH CARE EDUCATION/TRAINING PROGRAM
Payer: COMMERCIAL

## 2021-10-12 ENCOUNTER — OFFICE VISIT (OUTPATIENT)
Dept: MEDICAL GROUP | Facility: PHYSICIAN GROUP | Age: 21
End: 2021-10-12
Payer: COMMERCIAL

## 2021-10-12 VITALS
DIASTOLIC BLOOD PRESSURE: 78 MMHG | TEMPERATURE: 98.1 F | HEIGHT: 67 IN | WEIGHT: 173 LBS | OXYGEN SATURATION: 97 % | SYSTOLIC BLOOD PRESSURE: 106 MMHG | HEART RATE: 101 BPM | BODY MASS INDEX: 27.15 KG/M2

## 2021-10-12 DIAGNOSIS — Z01.419 WELL WOMAN EXAM: ICD-10-CM

## 2021-10-12 DIAGNOSIS — Z23 NEED FOR VACCINATION: ICD-10-CM

## 2021-10-12 DIAGNOSIS — Z11.3 ENCOUNTER FOR SPECIAL SCREENING EXAMINATION FOR INFECTION WITH PREDOMINANTLY SEXUAL MODE OF TRANSMISSION: ICD-10-CM

## 2021-10-12 DIAGNOSIS — E78.2 MIXED HYPERLIPIDEMIA: ICD-10-CM

## 2021-10-12 DIAGNOSIS — Z12.4 ENCOUNTER FOR PAPANICOLAOU SMEAR FOR CERVICAL CANCER SCREENING: ICD-10-CM

## 2021-10-12 DIAGNOSIS — N94.6 DYSMENORRHEA: ICD-10-CM

## 2021-10-12 PROBLEM — E55.9 VITAMIN D DEFICIENCY: Status: ACTIVE | Noted: 2021-10-12

## 2021-10-12 LAB — CYTOLOGY REG CYTOL: NORMAL

## 2021-10-12 PROCEDURE — 90471 IMMUNIZATION ADMIN: CPT | Performed by: STUDENT IN AN ORGANIZED HEALTH CARE EDUCATION/TRAINING PROGRAM

## 2021-10-12 PROCEDURE — 90715 TDAP VACCINE 7 YRS/> IM: CPT | Performed by: STUDENT IN AN ORGANIZED HEALTH CARE EDUCATION/TRAINING PROGRAM

## 2021-10-12 PROCEDURE — 90716 VAR VACCINE LIVE SUBQ: CPT | Performed by: STUDENT IN AN ORGANIZED HEALTH CARE EDUCATION/TRAINING PROGRAM

## 2021-10-12 PROCEDURE — 87591 N.GONORRHOEAE DNA AMP PROB: CPT

## 2021-10-12 PROCEDURE — 88175 CYTOPATH C/V AUTO FLUID REDO: CPT

## 2021-10-12 PROCEDURE — 99395 PREV VISIT EST AGE 18-39: CPT | Mod: 25 | Performed by: STUDENT IN AN ORGANIZED HEALTH CARE EDUCATION/TRAINING PROGRAM

## 2021-10-12 PROCEDURE — 87491 CHLMYD TRACH DNA AMP PROBE: CPT

## 2021-10-12 PROCEDURE — 90472 IMMUNIZATION ADMIN EACH ADD: CPT | Performed by: STUDENT IN AN ORGANIZED HEALTH CARE EDUCATION/TRAINING PROGRAM

## 2021-10-12 ASSESSMENT — LIFESTYLE VARIABLES
DURING THE PAST 12 MONTHS, ON HOW MANY DAYS DID YOU DRINK MORE THAN A FEW SIPS OF BEER, WINE, OR ANY DRINK CONTAINING ALCOHOL: 1
DURING THE PAST 12 MONTHS, ON HOW MANY DAYS DID YOU USE ANYTHING ELSE TO GET HIGH: 0
DURING THE PAST 12 MONTHS, ON HOW MANY DAYS DID YOU USE ANY MARIJUANA: 3
PART A TOTAL SCORE: 4
DURING THE PAST 12 MONTHS, ON HOW MANY DAYS DID YOU USE ANY TOBACCO OR NICOTINE PRODUCTS: 0

## 2021-10-12 ASSESSMENT — FIBROSIS 4 INDEX: FIB4 SCORE: 0.26

## 2021-10-12 NOTE — PATIENT INSTRUCTIONS
[facility]  [address], [address]  [place] [address]  Phone: [phone]    Check if you received 2 doses of the meningitis vaccines.  A regular multivitamin for folic acid is a good idea for all women of child baring age.

## 2021-10-12 NOTE — PROGRESS NOTES
Subjective:     CC: Pap smear    HPI:   Mackenzie Daniels is a 21 y.o. female who presents for annual exam. She is feeling well and denies any new complaints aside from chronic dysmenorrhea.    Ob-Gyn/ History:    Patient has GYN provider: no  /Para: G0  Last Pap Smear:  First today  Gyn Surgery:  Denies  Current Contraceptive Method:  Stopped the patch due to cramping/nausea and irregular menses.  Also states that she had a migraine-like headache with visual disturbances (aura), denies headache today.  Currently sexually active.  Last menstrual period:  Last .  Menses typically last 4 to 5 days, regular aside from with recent changes due to hormonal birth control.  Reports the first day is typically heavy, changes pads every 4 hours and not filled.  This time naproxen did help somewhat this time, but not prior.  Typically increases her fluids and uses a heating pad but pain is so bad she occasionally will miss work.  Painful menses is also accompanied with painful bowel movements and diarrhea, otherwise denies.Denies further dyspareunia. No vaginal discharge  Folate intake: Denies, recommended.    Health Maintenance  Cholesterol Screenin2020, ,   Diabetes Screening: normal fasting glucose 2020  Diet: Could improve (needs more veggies/fruit and fast food). Has decreased sugary beverages.   Exercise: Yes, active at work but otherwise denies.  Substance Abuse: Denies.  Safe in relationship.    Cancer screening  Colorectal Cancer Screening: start at 51yo  Lung Cancer Screening: n/a  Cervical Cancer Screening: PAP today  Breast Cancer Screening: consider at 39yo    Infectious disease screening/Immunizations  --STI Screening: GC/CT ordered today  --Practices safe sex.  --HIV Screening: negative 2020  --Hepatitis C Screening: negative 2020  --Immunizations:    Influenza: UTD   HPV: declines for now   Tetanus: due 2021, okay to receive today.   Other immunizations: Received  Moderna, last dose 7/1/2021.  Will check to see if meningitis vaccine has been given x2.  Elects to get second varicella today.    She  has a past medical history of Allergy, Anxiety, Arthritis, Depression, Syncope, and Urinary tract infection, site not specified.  She  has no past surgical history on file.    Family History   Problem Relation Age of Onset   • Arthritis Mother    • Other Mother         ovarian cyst   • No Known Problems Father    • No Known Problems Sister    • No Known Problems Brother    • Diabetes Maternal Grandmother    • Heart Disease Maternal Grandmother    • Hypertension Maternal Grandmother    • Hyperlipidemia Maternal Grandmother    • Cancer Maternal Grandmother         cervical     Social History     Socioeconomic History   • Marital status: Single     Spouse name: Not on file   • Number of children: Not on file   • Years of education: Not on file   • Highest education level: GED or equivalent   Occupational History   • Occupation:      Comment: [email or web address]   Tobacco Use   • Smoking status: Never Smoker   • Smokeless tobacco: Never Used   Vaping Use   • Vaping Use: Never used   Substance and Sexual Activity   • Alcohol use: Yes     Comment: rare   • Drug use: Yes     Types: Marijuana, Oral     Comment: about once a week   • Sexual activity: Yes     Partners: Male     Birth control/protection: Condom, Condom Male, Coitus Interruptus     Comment: 1 partner   Other Topics Concern   • Behavioral problems Not Asked   • Interpersonal relationships Not Asked   • Sad or not enjoying activities Not Asked   • Suicidal thoughts Not Asked   • Poor school performance Not Asked   • Reading difficulties Not Asked   • Speech difficulties Not Asked   • Writing difficulties Not Asked   • Inadequate sleep Not Asked   • Excessive TV viewing Not Asked   • Excessive video game use Not Asked   • Inadequate exercise Not Asked   • Sports related Not Asked   • Poor diet Not Asked   • Family concerns for  drug/alcohol abuse Not Asked   • Poor oral hygiene Not Asked   • Bike safety Not Asked   • Family concerns vehicle safety Not Asked   Social History Narrative   • Not on file     Social Determinants of Health     Financial Resource Strain: Unknown   • Difficulty of Paying Living Expenses: Patient refused   Food Insecurity: No Food Insecurity   • Worried About Running Out of Food in the Last Year: Never true   • Ran Out of Food in the Last Year: Never true   Transportation Needs: Unknown   • Lack of Transportation (Medical): Patient refused   • Lack of Transportation (Non-Medical): Patient refused   Physical Activity: Sufficiently Active   • Days of Exercise per Week: 3 days   • Minutes of Exercise per Session: 60 min   Stress: Stress Concern Present   • Feeling of Stress : Rather much   Social Connections: Unknown   • Frequency of Communication with Friends and Family: Twice a week   • Frequency of Social Gatherings with Friends and Family: Once a week   • Attends [employer] Services: 1 to 4 times per year   • Active Member of Clubs or Organizations: No   • Attends Club or Organization Meetings: Never   • Marital Status: Not on file     Patient Active Problem List    Diagnosis Date Noted   • Mixed hyperlipidemia 10/12/2021   • Vitamin D deficiency 10/12/2021   • BMI 27.0-27.9,adult 10/12/2021   • Dysmenorrhea 08/20/2021   • Anxiety 08/20/2021   • Depression 08/20/2021     Current Outpatient Medications   Medication Sig Dispense Refill   • Ascorbic Acid (VITAMIN C PO) Take 1 Tablet by mouth every day. OTC, unsure of dose     • VITAMIN D PO Take 1 Tablet by mouth every day. OTC, unsure of dose       No current facility-administered medications for this visit.     Allergies   Allergen Reactions   • Amoxicillin Hives     Review of Systems  Constitutional: Negative for fever, chills and malaise/fatigue.   HENT: Negative for congestion.    Respiratory: Negative for cough and shortness of breath.  Cardiovascular: Negative  "for chest pain  Gastrointestinal: Negative for vomiting.  Genitourinary: Negative for dysuria  Skin: Negative for rash.   Neurological: Negative for dizziness, focal weakness  Endo/Heme/Allergies: Does not bleed easily.   Psychiatric/Behavioral: Stable depression/anxiety pending behavioral health full set up    Objective:     /78   Pulse (!) 101   Temp 36.7 °C (98.1 °F) (Temporal)   Ht 1.702 m (5' 7\")   Wt 78.5 kg (173 lb)   LMP 10/05/2021 Comment: irregular this month  SpO2 97%   BMI 27.10 kg/m²   Body mass index is 27.1 kg/m².  Wt Readings from Last 4 Encounters:   10/12/21 78.5 kg (173 lb)   10/01/21 77.1 kg (170 lb)   09/14/21 77.1 kg (170 lb)   08/20/21 80.9 kg (178 lb 6.4 oz)       Physical Exam:  Constitutional: Well-developed and well-nourished. Not diaphoretic. No distress.   Skin: Skin is warm and dry. No rash noted.  Head: Atraumatic without lesions.  Eyes: Conjunctivae and extraocular motions are normal. Pupils are equal, round. No scleral icterus.   Mouth/Throat: Wearing mask  Neck: Supple, trachea midline. Normal range of motion. No thyromegaly present. No lymphadenopathy--cervical or supraclavicular.  Cardiovascular: Regular rate and rhythm, S1 and S2 without murmur, rubs, or gallops.  Lungs: Normal inspiratory effort, CTA bilaterally, no wheezes/rhonchi/rales  Abdomen: Soft, non tender, and without distention. Active bowel sounds in all four quadrants. No rebound, guarding, masses or HSM.   :Perineum and external genitalia normal without rash. Vagina with normal and physiologic discharge. Cervix without visible lesions or discharge, friable when PAP obtained. Bimanual exam with mild diffuse adnexal/uterine ttp, without masses or cervical motion tenderness.  No uterosacral ligament tenderness.  Extremities: No cyanosis, clubbing, erythema, nor edema.  Neurological: Alert and oriented x 3.  Normal gait  Psychiatric:  Behavior, mood, and affect are appropriate.    A chaperone was offered " to the patient during today's exam. Chaperone name: Betsy CashJUNIOR cee was present.     Labs: Reviewed from 6/23/2021  Imaging:  Pelvic US 9/15/2021  IMPRESSION:  1.  Irregular complex cystic lesion in the RIGHT ovary measuring 2 cm, likely involuting hemorrhagic cyst or follicle.  2.  No evidence for ovarian torsion.  3.  Small amount of free fluid, nonspecific.  4.  Minimal fluid within the cervical canal    Assessment and Plan:     1. Well woman exam  HCM: As above, patient to check on vaccinations as directed-declined HPV series.  Discussed age-appropriate anticipatory guidance.    2. Encounter for Papanicolaou smear for cervical cancer screening  First Pap, repeat in 3 years with normal.  - THINPREP PAP, REFLEX HPV ON ASC-US AND ABOVE; Future    3. Dysmenorrhea  This is a chronic condition.  In the past worsened by Depo-Provera and had adverse side effects to combined contraception (questionable migraine with aura, contraindication to estrogen).  Still suspect endometriosis given history, as she is had adverse side effects to hormonal options and no improvement with NSAIDs will refer to gynecology for further evaluation/treatment.  - REFERRAL TO GYNECOLOGY    4. Encounter for special screening examination for infection with predominantly sexual mode of transmission  Asymptomatic, routine screening.  - Chlamydia/GC PCR Urine Or Swab; Future    5. Mixed hyperlipidemia  Noted prior, not at threshold for medication.  We discussed some things that she can improve with her diet/exercise.  Consider checking in 3 to 5 years after last.    6. BMI 27.0-27.9,adult  This is a chronic condition, we discussed some dietary changes which might improve.  Will trend.    7. Need for vaccination  - Tdap =>6yo IM  - Varicella Vaccine SQ    Follow-up: Return in 1 year (on 10/12/2022), or if symptoms worsen or fail to improve, for Annual.

## 2021-11-16 ENCOUNTER — OFFICE VISIT (OUTPATIENT)
Dept: BEHAVIORAL HEALTH | Facility: CLINIC | Age: 21
End: 2021-11-16
Payer: COMMERCIAL

## 2021-11-16 DIAGNOSIS — F32.1 CURRENT MODERATE EPISODE OF MAJOR DEPRESSIVE DISORDER WITHOUT PRIOR EPISODE (HCC): ICD-10-CM

## 2021-11-16 DIAGNOSIS — F43.10 PTSD (POST-TRAUMATIC STRESS DISORDER): ICD-10-CM

## 2021-11-16 DIAGNOSIS — F43.10 POST-TRAUMATIC STRESS: ICD-10-CM

## 2021-11-16 PROCEDURE — 99214 OFFICE O/P EST MOD 30 MIN: CPT | Mod: GC | Performed by: STUDENT IN AN ORGANIZED HEALTH CARE EDUCATION/TRAINING PROGRAM

## 2021-11-16 PROCEDURE — 96127 BRIEF EMOTIONAL/BEHAV ASSMT: CPT | Performed by: PSYCHIATRY & NEUROLOGY

## 2021-11-16 RX ORDER — HYDROXYZINE PAMOATE 25 MG/1
25 CAPSULE ORAL NIGHTLY PRN
Qty: 30 CAPSULE | Refills: 1 | Status: SHIPPED | OUTPATIENT
Start: 2021-11-16 | End: 2021-12-22

## 2021-11-16 ASSESSMENT — PATIENT HEALTH QUESTIONNAIRE - PHQ9
SUM OF ALL RESPONSES TO PHQ QUESTIONS 1-9: 15
5. POOR APPETITE OR OVEREATING: 2 - MORE THAN HALF THE DAYS
CLINICAL INTERPRETATION OF PHQ2 SCORE: 4

## 2021-11-16 ASSESSMENT — ANXIETY QUESTIONNAIRES
7. FEELING AFRAID AS IF SOMETHING AWFUL MIGHT HAPPEN: MORE THAN HALF THE DAYS
3. WORRYING TOO MUCH ABOUT DIFFERENT THINGS: MORE THAN HALF THE DAYS
4. TROUBLE RELAXING: MORE THAN HALF THE DAYS
1. FEELING NERVOUS, ANXIOUS, OR ON EDGE: MORE THAN HALF THE DAYS
6. BECOMING EASILY ANNOYED OR IRRITABLE: MORE THAN HALF THE DAYS
2. NOT BEING ABLE TO STOP OR CONTROL WORRYING: MORE THAN HALF THE DAYS
GAD7 TOTAL SCORE: 13
5. BEING SO RESTLESS THAT IT IS HARD TO SIT STILL: SEVERAL DAYS
IF YOU CHECKED OFF ANY PROBLEMS ON THIS QUESTIONNAIRE, HOW DIFFICULT HAVE THESE PROBLEMS MADE IT FOR YOU TO DO YOUR WORK, TAKE CARE OF THINGS AT HOME, OR GET ALONG WITH OTHER PEOPLE: VERY DIFFICULT
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
## 2021-12-10 ENCOUNTER — TELEPHONE (OUTPATIENT)
Dept: OBGYN | Facility: CLINIC | Age: 21
End: 2021-12-10

## 2021-12-10 ENCOUNTER — GYNECOLOGY VISIT (OUTPATIENT)
Dept: OBGYN | Facility: CLINIC | Age: 21
End: 2021-12-10
Payer: COMMERCIAL

## 2021-12-10 VITALS — DIASTOLIC BLOOD PRESSURE: 84 MMHG | SYSTOLIC BLOOD PRESSURE: 136 MMHG | WEIGHT: 169 LBS | BODY MASS INDEX: 26.47 KG/M2

## 2021-12-10 DIAGNOSIS — N94.6 DYSMENORRHEA: ICD-10-CM

## 2021-12-10 DIAGNOSIS — Z30.09 GENERAL COUNSELING AND ADVICE ON FEMALE CONTRACEPTION: ICD-10-CM

## 2021-12-10 PROCEDURE — 99203 OFFICE O/P NEW LOW 30 MIN: CPT | Performed by: OBSTETRICS & GYNECOLOGY

## 2021-12-10 ASSESSMENT — FIBROSIS 4 INDEX: FIB4 SCORE: 0.26

## 2021-12-10 NOTE — PATIENT INSTRUCTIONS
Levonorgestrel intrauterine device (IUD)  What is this medicine?  LEVONORGESTREL IUD (MICHEAL collier) is a contraceptive (birth control) device. The device is placed inside the uterus by a healthcare professional. It is used to prevent pregnancy. This device can also be used to treat heavy bleeding that occurs during your period.  This medicine may be used for other purposes; ask your health care provider or pharmacist if you have questions.  COMMON BRAND NAME(S): Ruslanena, LILETTA, Mirena, Margarita  What should I tell my health care provider before I take this medicine?  They need to know if you have any of these conditions:  · abnormal Pap smear  · cancer of the breast, uterus, or cervix  · diabetes  · endometritis  · genital or pelvic infection now or in the past  · have more than one sexual partner or your partner has more than one partner  · heart disease  · history of an ectopic or tubal pregnancy  · immune system problems  · IUD in place  · liver disease or tumor  · problems with blood clots or take blood-thinners  · seizures  · use intravenous drugs  · uterus of unusual shape  · vaginal bleeding that has not been explained  · an unusual or allergic reaction to levonorgestrel, other hormones, silicone, or polyethylene, medicines, foods, dyes, or preservatives  · pregnant or trying to get pregnant  · breast-feeding  How should I use this medicine?  This device is placed inside the uterus by a health care professional.  Talk to your pediatrician regarding the use of this medicine in children. Special care may be needed.  Overdosage: If you think you have taken too much of this medicine contact a poison control center or emergency room at once.  NOTE: This medicine is only for you. Do not share this medicine with others.  What if I miss a dose?  This does not apply. Depending on the brand of device you have inserted, the device will need to be replaced every 3 to 6 years if you wish to continue using this type  of birth control.  What may interact with this medicine?  Do not take this medicine with any of the following medications:  · amprenavir  · bosentan  · fosamprenavir  This medicine may also interact with the following medications:  · aprepitant  · armodafinil  · barbiturate medicines for inducing sleep or treating seizures  · bexarotene  · boceprevir  · griseofulvin  · medicines to treat seizures like carbamazepine, ethotoin, felbamate, oxcarbazepine, phenytoin, topiramate  · modafinil  · pioglitazone  · rifabutin  · rifampin  · rifapentine  · some medicines to treat HIV infection like atazanavir, efavirenz, indinavir, lopinavir, nelfinavir, tipranavir, ritonavir  · Elva's wort  · warfarin  This list may not describe all possible interactions. Give your health care provider a list of all the medicines, herbs, non-prescription drugs, or dietary supplements you use. Also tell them if you smoke, drink alcohol, or use illegal drugs. Some items may interact with your medicine.  What should I watch for while using this medicine?  Visit your doctor or health care professional for regular check ups. See your doctor if you or your partner has sexual contact with others, becomes HIV positive, or gets a sexual transmitted disease.  This product does not protect you against HIV infection (AIDS) or other sexually transmitted diseases.  You can check the placement of the IUD yourself by reaching up to the top of your vagina with clean fingers to feel the threads. Do not pull on the threads. It is a good habit to check placement after each menstrual period. Call your doctor right away if you feel more of the IUD than just the threads or if you cannot feel the threads at all.  The IUD may come out by itself. You may become pregnant if the device comes out. If you notice that the IUD has come out use a backup birth control method like condoms and call your health care provider.  Using tampons will not change the position of the  IUD and are okay to use during your period.  This IUD can be safely scanned with magnetic resonance imaging (MRI) only under specific conditions. Before you have an MRI, tell your healthcare provider that you have an IUD in place, and which type of IUD you have in place.  What side effects may I notice from receiving this medicine?  Side effects that you should report to your doctor or health care professional as soon as possible:  · allergic reactions like skin rash, itching or hives, swelling of the face, lips, or tongue  · fever, flu-like symptoms  · genital sores  · high blood pressure  · no menstrual period for 6 weeks during use  · pain, swelling, warmth in the leg  · pelvic pain or tenderness  · severe or sudden headache  · signs of pregnancy  · stomach cramping  · sudden shortness of breath  · trouble with balance, talking, or walking  · unusual vaginal bleeding, discharge  · yellowing of the eyes or skin  Side effects that usually do not require medical attention (report to your doctor or health care professional if they continue or are bothersome):  · acne  · breast pain  · change in sex drive or performance  · changes in weight  · cramping, dizziness, or faintness while the device is being inserted  · headache  · irregular menstrual bleeding within first 3 to 6 months of use  · nausea  This list may not describe all possible side effects. Call your doctor for medical advice about side effects. You may report side effects to FDA at 3-074-FDA-8589.  Where should I keep my medicine?  This does not apply.  NOTE: This sheet is a summary. It may not cover all possible information. If you have questions about this medicine, talk to your doctor, pharmacist, or health care provider.  © 2020 [employer] Standard (2019-10-29 13:22:01)  HPV Vaccine  Questions and Answers  WHAT IS HUMAN PAPILLOMAVIRUS (HPV)?  HPV is a virus that can lead to cervical cancer; vulvar and vaginal cancers; penile cancer; anal cancer and  genital warts (warts in the genital areas). More than 1 vaccine is available to help you or your child with protection against HPV. Your caregiver can talk to you about which one might give you the best protection.  WHO SHOULD GET THIS VACCINE?  The HPV vaccine is most effective when given before the onset of sexual activity.  · This vaccine is recommended for girls 11 or 12 years of age. It can be given to girls as young as 9 years old.   · HPV vaccine can be given to males, 9 through 26 years of age, to reduce the likelihood of acquiring genital warts.   · HPV vaccine can be given to males and females aged 9 through 26 years to prevent anal cancer.   HPV vaccine is not generally recommended after age 26, because most individuals have been exposed to the HPV virus by that age.  HOW EFFECTIVE IS THIS VACCINE?   The vaccine is generally effective in preventing cervical; vulvar and vaginal cancers; penile cancer; anal cancer and genital warts caused by 4 types of HPV. The vaccine is less effective in those individuals who are already infected with HPV. This vaccine does not treat existing HPV, genital warts, pre-cancers or cancers.  WILL SEXUALLY ACTIVE INDIVIDUALS BENEFIT FROM THE VACCINE?  Sexually active individuals may still benefit from the vaccine but may get less benefit due to previous HPV exposure.  HOW AND WHEN IS THE VACCINE ADMINISTERED?  The vaccine is given in a series of 3 injections (shots) over a 6 month period in both males and females. The exact timing depends on which specific vaccine your caregiver recommends for you.  IS THE HPV VACCINE SAFE?   The federal government has approved the HPV vaccine as safe and effective. This vaccine was tested in both males and females in many countries around the world. The most common side effect is soreness at the injection site. Since the drug became approved, there has been some concern about patients passing out after being vaccinated, which has led to a  recommendation of a 15 minute waiting period following vaccination. This practice may decrease the small risk of passing out.  Additionally there is a rare risk of anaphylaxis (an allergic reaction) to the vaccine and a risk of a blood clot among individuals with specific risk factors for a blood clot.  DOES THIS VACCINE CONTAIN THIMEROSAL OR MERCURY?  No. There is no thimerosal or mercury in the HPV vaccine. It is made of proteins from the outer coat of the virus (HPV). There is no infectious material in this vaccine.  WILL GIRLS/WOMEN WHO HAVE BEEN VACCINATED STILL NEED CERVICAL CANCER SCREENING?  Yes. There are 3 reasons why women will still need regular cervical cancer screening. First, the vaccine will NOT provide protection against all types of HPV that cause cervical cancer. Vaccinated women will still be at risk for some cancers. Second, some women may not get all required doses of the vaccine (or they may not get them at the recommended times). Therefore, they may not get the vaccine's full benefits. Third, women may not get the full benefit of the vaccine if they receive it after they have already acquired any of the 4 types of HPV.  WILL THE HPV VACCINE BE COVERED BY INSURANCE PLANS?  While some insurance companies may cover the vaccine, others may not. Most large group insurance plans cover the costs of recommended vaccines.  WHAT KIND OF GOVERNMENT PROGRAMS MAY BE AVAILABLE TO COVER HPV VACCINE?  Federal health programs such as Vaccines for Children (VFC) will cover the HPV vaccine. The VFC program provides free vaccines to children and adolescents under 19 years of age, who are either uninsured, Medicaid-eligible,  or . There are over 45,000 sites that provide VFC vaccines including hospital, private and public clinics. The VFC program also allows children and adolescents to get VFC vaccines through [facility] or [facility] if their private  health insurance does not cover the vaccine. Some states also provide free or low-cost vaccines, at public health clinics, to people without health insurance coverage for vaccines.  GENITAL HPV: WHY IS HPV IMPORTANT?  Genital HPV is the most common virus transmitted through genital contact, most often during vaginal and anal sex. About 40 types of HPV can infect the genital areas of men and women. While most HPV types cause no symptoms and go away on their own, some types can cause cervical cancer in women. These types also cause other less common genital cancers, including cancers of the penis, anus, vagina (birth canal), and vulva (area around the opening of the vagina). Other types of HPV can cause genital warts in men and women.  HOW COMMON IS HPV?   · At least 50% of sexually active people will get HPV at some time in their lives. HPV is most common in young women and men who are in their late teens and early 20s.   · Anyone who has ever had genital contact with another person can get HPV. Both men and women can get it and pass it on to their sex partners without realizing it.   IS HPV THE SAME THING AS HIV OR HERPES?  HPV is NOT the same as HIV or Herpes (Herpes simplex virus or HSV). While these are all viruses that can be sexually transmitted, HIV and HSV do not cause the same symptoms or health problems as HPV.  CAN HPV AND ITS ASSOCIATED DISEASES BE TREATED?  There is no treatment for HPV. There are treatments for the health problems that HPV can cause, such as genital warts, cervical cell changes, and cancers of the cervix (lower part of the womb), vulva, vagina and anus.   HOW IS HPV RELATED TO CERVICAL CANCER?  Some types of HPV can infect a woman's cervix and cause the cells to change in an abnormal way. Most of the time, HPV goes away on its own. When HPV is gone, the cervical cells go back to normal. Sometimes, HPV does not go away. Instead, it lingers (persists) and continues to change the cells on  a woman's cervix. These cell changes can lead to cancer over time if they are not treated.  ARE THERE OTHER WAYS TO PREVENT CERVICAL CANCER?  Regular Pap tests and follow-up can prevent most, but not all, cases of cervical cancer. Pap tests can detect cell changes (or pre-cancers) in the cervix before they turn into cancer. Pap tests can also detect most, but not all, cervical cancers at an early, curable stage. Most women diagnosed with cervical cancer have either never had a Pap test, or not had a Pap test in the last 5 years.  There is also an HPV DNA test available for use with the Pap test as part of cervical cancer screening. This test may be ordered for women over 30 or for women who get an unclear (borderline) Pap test result. While this test can tell if a woman has HPV on her cervix, it cannot tell which types of HPV she has.  If the HPV DNA test is negative for HPV DNA, then screening may be done every 3 years. If the HPV DNA test is positive for HPV DNA, then screening should be done every 6 to 12 months.  OTHER QUESTIONS ABOUT THE HPV VACCINE  WHAT HPV TYPES DOES THE VACCINE PROTECT AGAINST?  The HPV vaccine protects against the HPV types that cause most (70%) cervical cancers (types 16 and 18), most (78%) anal cancers (types 16 and 18) and the two HPV types that cause most (90%) genital warts (types 6 and 11).  WHAT DOES THE VACCINE NOT PROTECT AGAINST?   Because the vaccine does not protect against all types of HPV, it will not prevent all cases of cervical cancer, anal cancer, other genital cancers or genital warts. About 30% of cervical cancers are not prevented with vaccination, so it will be important for women to continue screening for cervical cancer (regular Pap tests). Also, the vaccine does not prevent about 10% of genital warts nor will it prevent other sexually transmitted infections (STIs), including HIV. Therefore, it will still be important for sexually active adults to practice safe sex  to reduce exposure to HPV and other STI's.  HOW LONG DOES VACCINE PROTECTION LAST? WILL A BOOSTER SHOT BE NEEDED?  So far, studies have followed women for 5 years and found that they are still protected. Currently, additional (booster) doses are not recommended. More research is being done to find out how long protection will last, and if a booster vaccine is needed years later.   WHY IS THE HPV VACCINE RECOMMENDED AT SUCH A YOUNG AGE?  Ideally, males and females should get the vaccine before they are sexually active since this vaccine is most effective in individuals who have not yet acquired any of the HPV vaccine types. Individuals who have not been infected with any of the 4 types of HPV will get the full benefits of the vaccine.   SHOULD PREGNANT WOMEN BE VACCINATED?  The vaccine is not recommended for pregnant women. There has been limited research looking at vaccine safety for pregnant women and their developing fetus. Studies suggest that the vaccine has not caused health problems during pregnancy, nor has it caused health problems for the infant. Pregnant women should complete their pregnancy before getting the vaccine. If a woman finds out she is pregnant after she has started getting the vaccine series, she should complete her pregnancy before finishing the 3 doses.  SHOULD BREASTFEEDING MOTHERS BE VACCINATED?  Mothers nursing their babies may get the vaccine because the virus is inactivated and will not harm the mother or baby.  WILL INDIVIDUALS BE PROTECTED AGAINST HPV AND RELATED DISEASES, EVEN IF THEY DO NOT GET ALL 3 DOSES?  It is not yet known how much protection individuals will get from receiving only 1 or 2 doses of the vaccine. For this reason, it is very important that individuals get all 3 doses of the vaccine.  WILL CHILDREN BE REQUIRED TO BE VACCINATED TO ENTER SCHOOL?  There are no federal laws that require children or adolescents to get vaccinated. All school entry laws are state laws so  they vary from state to state. To find out what vaccines are needed for children or adolescents to enter school in your state, check with your state health department or board of education.  ARE THERE OTHER WAYS TO PREVENT HPV?  The only sure way to prevent HPV is to abstain from all sexual activity. Sexually active adults can reduce their risk by being in a mutually monogamous relationship with someone who has had no other sex partners. But even individuals with only 1 lifetime sex partner can get HPV, if their partner has had a previous partner with HPV.  It is unknown how much protection condoms provide against HPV, since areas that are not covered by a condom can be exposed to the virus. However, condoms may reduce the risk of genital warts and cervical cancer. They can also reduce the risk of HIV and some other sexually transmitted infections (STIs), when used consistently and correctly (all the time and the right way).  Document Released: 12/18/2006 Document Revised: 03/11/2013 Document Reviewed: 08/13/2010  [employer]® Patient Information ©2013 [employer]  Human Papillomavirus  Human papillomavirus (HPV) is the most common sexually transmitted infection (STI). It easily spreads from person to person (is very contagious). There are many types of HPV. HPV often does not cause symptoms. Sometimes it may cause genital warts that can be seen and felt. There may also be wart-like lesions in the throat.  You can have HPV for a long time and not know it. You may spread HPV to others without knowing it. Certain types of HPV may cause cancer.  What are the causes?  HPV is caused by a virus that spreads from person to person through sex.  What increases the risk?  You may be more likely to get HPV if you have or have had:  · Unprotected sex of any kind.  · Several sex partners.  · A sex partner who has other sex partners.  · Another STI.  · A weak disease-fighting system (immune system).  · Damaged skin in the  genital, oral, or anal area.  What are the signs or symptoms?  Most people who have HPV do not have any symptoms. If you have symptoms, they may include:  · Wart-like lesions in the throat from having oral sex.  · Warts on the infected areas.  · Genital warts that may itch, burn, bleed, or be painful during sex.  How is this treated?  There is no treatment for the virus itself. However, there are treatments for the health problems and symptoms HPV can cause. Your doctor may treat HPV by:  · Giving medicines that are creams, lotions, liquids, or gels. These medicines may be injected into or put onto genital or anal warts.  · Applying one of the following to the genital or anal warts:  ? Extreme cold.  ? An intense beam of light (laser treatment).  ? Extreme heat.  · Doing surgery to remove the genital or anal warts.  Your doctor will monitor you closely after you are treated. HPV can come back and you may need treatment again.  Follow these instructions at home:  Medicines  · Take over-the-counter and prescription medicines only as told by your doctor.  · Do not treat genital warts with medicines that are meant for hand warts.  General instructions  · Do not touch or scratch the warts.  · Do not have sex while you are being treated.  · Do not douche or use tampons during treatment (for women).  · Tell your sex partner about your infection. He or she may also need to be treated.  · If you get pregnant, tell your doctor that you have HPV. Your doctor will monitor you during the pregnancy.  · Keep all follow-up visits as told by your doctor. This is important.  How is this prevented?  · Talk with your doctor about getting an HPV vaccine. This can prevent some HPV infections and related cancers. You may need 2-3 doses of the vaccine, depending on your age.  ? The vaccine will not work if you already have HPV.  ? The vaccine is not recommended for pregnant women.  · After treatment, use condoms during sex. This helps to  prevent future infections.  · Have only one sex partner.  · Have a sex partner who does not have other sex partners.  · Get Pap tests as told by your doctor.  Contact a doctor if:  · The treated skin gets red, swollen, or painful.  · You have a fever.  · You feel ill.  · You feel lumps or pimples in and around your genital or anal area.  · You have bleeding from the vagina.  · You have bleeding from the area that was treated.  · You have pain during sex.  Summary  · HPV is the most common STI. It easily spreads from person to person.  · HPV often does not cause any symptoms.  · HPV vaccine can prevent some HPV infections and related cancers.  · There is no treatment for the virus itself. However, there are treatments for the health problems and symptoms HPV can cause.  This information is not intended to replace advice given to you by your health care provider. Make sure you discuss any questions you have with your health care provider.  Document Released: 11/30/2009 Document Revised: 04/09/2020 Document Reviewed: 08/15/2019  [employer] Patient Education © 2020 [employer]    HPV Vaccine Information for Parents    HPV (human papillomavirus) is a common virus that spreads from person to person through sexual contact. It can spread during vaginal, anal, or oral sex. There are many types of HPV viruses, and some may cause cancer.  Your child can get a vaccination to prevent HPV infection and cancer. The vaccine is both safe and effective. It is recommended for boys and girls at about 11-12 years of age. Getting the vaccination at this age--before becoming sexually active--gives your child the best chance at protection from HPV infection through adulthood.  How can HPV affect my child?  HPV infection can cause:  · Genital warts.  · Mouth or throat cancer (oropharyngeal cancer).  · Anal cancer.  · Cervical, vulvar, or vaginal cancer.  · Penile cancer.  During pregnancy, HPV infection can be passed to the baby. This  infection can cause warts to develop in a baby's throat and windpipe.  What actions can I take to lower my child's risk for HPV?  To lower your child's risk for HPV infection, have him or her get the HPV vaccination before becoming sexually active. The best time for vaccination is between ages 11 and 12, though it can be given to children as young as 9 years old. If your child gets the first dose before age 15, the vaccination can be given as 2 shots (doses), 6-12 months apart. In some situations, 3 doses are needed:  · If your child starts the vaccine before age 15 but does not have a second dose within 6-12 months, your child will need 3 doses to complete the vaccination. When your child has the first dose, it is important to make an appointment for the next shot and keep the appointment.  · Teens who are not vaccinated before age 15 will need 3 doses given within 6 months.  · If your child has a weak immune system, he or she may need 3 doses.  Young adults can also get the vaccination, even if they are already sexually active and even if they have already been infected with HPV. The vaccination can still help prevent the types of cancer-causing HPV that a person has not been infected with.  What are the risks and benefits of the HPV vaccine?  Benefits  The main benefit of getting vaccinated is to prevent certain cancers, including:  · Cervical, vulvar, and vaginal cancer in females.  · Penile cancer in males.  · Oral and anal cancer in both males and females.  The risk of these cancers is lower if your child gets vaccinated before he or she becomes sexually active.  The vaccine also prevents genital warts caused by HPV.  Risks  The risks, although low, include side effects or reactions to the vaccine. Very few reactions have been reported, but they can include:  · Soreness, redness, or swelling at the injection site.  · Dizziness or headache.  · Fever.  Who should not get the HPV vaccine or should wait to get  it?  Some children should not get the HPV vaccine or should wait. Discuss the risks and benefits of the vaccine with your child's health care provider if your child:  · Has had a severe allergic reaction to other vaccinations.  · Is allergic to yeast.  · Has a fever.  · Has had a recent illness.  · Is pregnant or may be pregnant.  Where to find more information  · Centers for Disease Control and Prevention: cdc.gov/hpv  · American Academy of Pediatrics: healthychildren.org  Summary  · HPV (human papillomavirus) is a common virus that spreads from person to person through sexual contact. It can spread during vaginal, anal, or oral sex.  · Your child can get a vaccination to prevent HPV infection and cancer. It is best to get the vaccination before becoming sexually active.  · The HPV vaccine can protect your child from genital warts and certain types of cancer, including cancer of the cervix, throat, mouth, vulva, vagina, anus, and penis.  · The HPV vaccine is both safe and effective.  · The best time for boys and girls to get the vaccination is when they are between ages 11 and 12.  This information is not intended to replace advice given to you by your health care provider. Make sure you discuss any questions you have with your health care provider.  Document Released: 03/07/2019 Document Revised: 05/06/2019 Document Reviewed: 03/07/2019  [employer] Patient Education © 2020 [employer]    Dysmenorrhea    Dysmenorrhea refers to cramps caused by the muscles of the uterus tightening (keyon) during a menstrual period. Dysmenorrhea may be mild, or it may be severe enough to interfere with everyday activities for a few days each month.  Primary dysmenorrhea is menstrual cramps that last a couple of days when you start having menstrual periods or soon after. This often begins after a teenager starts having her period. As a woman gets older or has a baby, the cramps will usually lessen or disappear. Secondary  dysmenorrhea begins later in life and is caused by a disorder in the reproductive system. It lasts longer, and it may cause more pain than primary dysmenorrhea. The pain may start before the period and last a few days after the period.  What are the causes?  Dysmenorrhea is usually caused by an underlying problem, such as:  · The tissue that lines the uterus (endometrium) growing outside of the uterus in other areas of the body (endometriosis).  · Endometrial tissue growing into the muscular walls of the uterus (adenomyosis).  · Blood vessels in the pelvis becoming filled with blood just before the menstrual period (pelvic congestive syndrome).  · Overgrowth of cells (polyps) in the endometrium or the lower part of the uterus (cervix).  · The uterus dropping down into the vagina (prolapse) due to stretched or weak muscles.  · Bladder problems, such as infection or inflammation.  · Intestinal problems, such as a tumor or irritable bowel syndrome.  · Cancer of the reproductive organs or bladder.  · A severely tipped uterus.  · A cervix that is closed or has a very small opening.  · Noncancerous (benign) tumors of the uterus (fibroids).  · Pelvic inflammatory disease (PID).  · Pelvic scarring (adhesions) from a previous surgery.  · An ovarian cyst.  · An IUD (intrauterine device).  What increases the risk?  You are more likely to develop this condition if:  · You are younger than age 30.  · You started puberty early.  · You have irregular or heavy bleeding.  · You have never given birth.  · You have a family history of dysmenorrhea.  · You smoke.  What are the signs or symptoms?  Symptoms of this condition include:  · Cramping, throbbing pain, or a feeling of fullness in the lower abdomen.  · Lower back pain.  · Periods lasting for longer than 7 days.  · Headaches.  · Bloating.  · Fatigue.  · Nausea or vomiting.  · Diarrhea.  · Sweating or dizziness.  · Loose stools.  How is this diagnosed?  This condition may be  diagnosed based on:  · Your symptoms.  · Your medical history.  · A physical exam.  · Blood tests.  · A Pap test. This is a test in which cells from the cervix are tested for signs of cancer or infection.  · A pregnancy test.  · Imaging tests, such as:  ? Ultrasound.  ? A procedure to remove and examine a sample of endometrial tissue (dilation and curettage, D&C).  ? A procedure to visually examine the inside of:  § The uterus (hysteroscopy).  § The abdomen or pelvis (laparoscopy).  § The bladder (cystoscopy).  § The intestine (colonoscopy).  § The stomach (gastroscopy).  ? X-rays.  ? CT scan.  ? MRI.  How is this treated?  Treatment depends on the cause of the dysmenorrhea. Treatment may include:  · Pain medicine prescribed by your health care provider.  · Birth control pills that contain the hormone progesterone.  · An IUD that contains the hormone progesterone.  · Medicines to control bleeding.  · Hormone replacement therapy.  · NSAIDs. These may help to stop the production of hormones that cause cramps.  · Antidepressant medicines.  · Surgery to remove adhesions, endometriosis, ovarian cysts, fibroids, or the entire uterus (hysterectomy).  · Injections of progesterone to stop the menstrual period.  · A procedure to destroy the endometrium (endometrial ablation).  · A procedure to cut the nerves in the bottom of the spine (sacrum) that go to the reproductive organs (presacral neurectomy).  · A procedure to apply an electric current to nerves in the sacrum (sacral nerve stimulation).  · Exercise and physical therapy.  · Meditation and yoga therapy.  · Acupuncture.  Work with your health care provider to determine what treatment or combination of treatments is best for you.  Follow these instructions at home:  Relieving pain and cramping  · Apply heat to your lower back or abdomen when you experience pain or cramps. Use the heat source that your health care provider recommends, such as a moist heat pack or a  heating pad.  ? Place a towel between your skin and the heat source.  ? Leave the heat on for 20-30 minutes.  ? Remove the heat if your skin turns bright red. This is especially important if you are unable to feel pain, heat, or cold. You may have a greater risk of getting burned.  ? Do not sleep with a heating pad on.  · Do aerobic exercises, such as walking, swimming, or biking. This can help to relieve cramps.  · Massage your lower back or abdomen to help relieve pain.  General instructions  · Take over-the-counter and prescription medicines only as told by your health care provider.  · Do not drive or use heavy machinery while taking prescription pain medicine.  · Avoid alcohol and caffeine during and right before your menstrual period. These can make cramps worse.  · Do not use any products that contain nicotine or tobacco, such as cigarettes and e-cigarettes. If you need help quitting, ask your health care provider.  · Keep all follow-up visits as told by your health care provider. This is important.  Contact a health care provider if:  · You have pain that gets worse or does not get better with medicine.  · You have pain with sex.  · You develop nausea or vomiting with your period that is not controlled with medicine.  Get help right away if:  · You faint.  Summary  · Dysmenorrhea refers to cramps caused by the muscles of the uterus tightening (keyon) during a menstrual period.  · Dysmenorrhea may be mild, or it may be severe enough to interfere with everyday activities for a few days each month.  · Treatment depends on the cause of the dysmenorrhea.  · Work with your health care provider to determine what treatment or combination of treatments is best for you.  This information is not intended to replace advice given to you by your health care provider. Make sure you discuss any questions you have with your health care provider.  Document Released: 12/18/2006 Document Revised: 11/30/2018 Document  Reviewed: 01/20/2018  Elsevier Patient Education © 2020 [employer]

## 2021-12-10 NOTE — PROGRESS NOTES
21 y.o.     Female presents as new patient for pelvic pain. Pt reports the first 2 days of her period are very painful. She sometimes has to miss work due to the pain. She sometimes has some nausea associated no vomiting.  Denies pain with urination or defecation. Has tried naprox 500 mg, ibuprofen cant recall dosage. Periods are not too heavy, nml per pt report. Has tried depo provera and the patch in the past. Both of those options made her have migraine headache. She also had a lot of AUB with the depo. She also had nausea with those options too. They did not change the period pain. Currently using condoms for BC. Not interested in pregnancy in the near future. No pain at other times of the month. No pain with intercourse.     Vaccines:   COVID vaccine: Yes  Flu vaccine: Yes  HPV vaccine: No       ROS:  Neurological: Denies  Breast: Denies breast tenderness, mass, discharge, changes in size or contour, or abnormal cyclic discomfort.  Gastrointestinal: Negative for abdominal discomfort, blood in stools or black stools  Genito-urinary: Negative for dysuria, frequency and incontinence  All other systems reviewed : and are Negative    PMH:  Past Medical History:   Diagnosis Date   • Allergy     seasonal   • Anxiety    • Arthritis     knees   • Depression    • Hyperlipidemia    • Syncope     cards/neuro eval normal per patient   • Urinary tract infection, site not specified        History reviewed. No pertinent surgical history.    Past GYN hx:  None    Family History   Problem Relation Age of Onset   • Arthritis Mother    • Other Mother         ovarian cyst   • No Known Problems Father    • No Known Problems Sister    • No Known Problems Brother    • Diabetes Maternal Grandmother    • Heart Disease Maternal Grandmother    • Hypertension Maternal Grandmother    • Hyperlipidemia Maternal Grandmother    • Cancer Maternal Grandmother         cervical       Social History     Socioeconomic History   • Marital status:  Single     Spouse name: Not on file   • Number of children: Not on file   • Years of education: Not on file   • Highest education level: GED or equivalent   Occupational History   • Occupation:      Comment: [employer]   Tobacco Use   • Smoking status: Never Smoker   • Smokeless tobacco: Never Used   Vaping Use   • Vaping Use: Never used   Substance and Sexual Activity   • Alcohol use: Yes     Comment: rare   • Drug use: Not Currently     Comment: about once a week   • Sexual activity: Yes     Partners: Male     Birth control/protection: Condom, Condom Male     Comment: 1 partner   Other Topics Concern   • Behavioral problems Not Asked   • Interpersonal relationships Not Asked   • Sad or not enjoying activities Not Asked   • Suicidal thoughts Not Asked   • Poor school performance Not Asked   • Reading difficulties Not Asked   • Speech difficulties Not Asked   • Writing difficulties Not Asked   • Inadequate sleep Not Asked   • Excessive TV viewing Not Asked   • Excessive video game use Not Asked   • Inadequate exercise Not Asked   • Sports related Not Asked   • Poor diet Not Asked   • Family concerns for drug/alcohol abuse Not Asked   • Poor oral hygiene Not Asked   • Bike safety Not Asked   • Family concerns vehicle safety Not Asked   Social History Narrative   • Not on file     Social Determinants of Health     Financial Resource Strain: Unknown   • Difficulty of Paying Living Expenses: Patient refused   Food Insecurity: No Food Insecurity   • Worried About Running Out of Food in the Last Year: Never true   • Ran Out of Food in the Last Year: Never true   Transportation Needs: Unknown   • Lack of Transportation (Medical): Patient refused   • Lack of Transportation (Non-Medical): Patient refused   Physical Activity: Sufficiently Active   • Days of Exercise per Week: 3 days   • Minutes of Exercise per Session: 60 min   Stress: Stress Concern Present   • Feeling of Stress : Rather much   Social  Connections: Unknown   • Frequency of Communication with Friends and Family: Twice a week   • Frequency of Social Gatherings with Friends and Family: Once a week   • Attends Yazdanism Services: 1 to 4 times per year   • Active Member of Clubs or Organizations: No   • Attends Club or Organization Meetings: Never   • Marital Status: Not on file   Intimate Partner Violence:    • Fear of Current or Ex-Partner: Not on file   • Emotionally Abused: Not on file   • Physically Abused: Not on file   • Sexually Abused: Not on file   Housing Stability: Unknown   • Unable to Pay for Housing in the Last Year: Patient refused   • Number of Places Lived in the Last Year: 1   • Unstable Housing in the Last Year: No       Current Outpatient Medications on File Prior to Visit   Medication Sig Dispense Refill   • sertraline (ZOLOFT) 50 MG Tab Take 0.5 Tablets by mouth every day for 7 days, THEN 1 Tablet every day for 23 days. 27 Tablet 1   • hydrOXYzine pamoate (VISTARIL) 25 MG Cap Take 1 Capsule by mouth at bedtime as needed for Anxiety (sleep). May take additional capsule if needed 30 Capsule 1     No current facility-administered medications on file prior to visit.       Summary of Allergies:  Amoxicillin  /84   Wt 76.7 kg (169 lb)   LMP 12/03/2021 (Approximate)   BMI 26.47 kg/m²     Exam:  Skin: Warm and dry with no lesions or rashes.  Lymph: no cervical nodes, no supraclavicular nodes, no axillary nodes  Neuro: Awake, alert and oriented x 3  BREAST: Inspection negative. No nipple discharge or bleeding. No masses or nodularity palpable  Abdominal : negative, Abdomen soft, non-tender. No masses or organomegaly  Genito-Urinary:Perineum and external genitalia normal, Vagina normal w/o redness or discharge, Cervix w/o lesions or discharge, Uterus normal size, shape, mid-position, non-tender, no masses, Adnexa w/o mass or tenderness  Extremities:no cyanosis, clubbing or edema present    Psych: normal affect    Assessment and  Plan:  21 y.o.  here for dysmenorraha  Patient Active Problem List   Diagnosis   • Dysmenorrhea   • Anxiety   • Depression   • Mixed hyperlipidemia   • Vitamin D deficiency   • BMI 27.0-27.9,adult   • PTSD (post-traumatic stress disorder)   • Current moderate episode of major depressive disorder without prior episode (HCC)      -We reviewed pain management options for dysmenorrhea, ibuprofen 1 day prior to periods, etc.  -We reviewed etiology of Dysmenorrhea, possible endometriosis but that could only be dx with surgery. Pt aware we could try alternative treatments but if pain persists then we could consider surgery. Pt agrees.   -Discussed alternative treatments such as Mirena IUD to both offer BC and lighter and possible less painful period. Pt is interested in this. Info given to pt re: IUD  -Discussed importance of HPV vaccine, pt will consider  -Pt should RTC should she like to proceed with IUD insertion.

## 2021-12-10 NOTE — NON-PROVIDER
Patient here c/o Dysmenorrhea  LMP= 12/03/2021  BCM: condoms  Last pap date/result: 10/2021 WNL Per pt  Phone number: [phone]  Pharmacy confirmed.

## 2021-12-22 ENCOUNTER — OFFICE VISIT (OUTPATIENT)
Dept: BEHAVIORAL HEALTH | Facility: CLINIC | Age: 21
End: 2021-12-22
Payer: COMMERCIAL

## 2021-12-22 DIAGNOSIS — F43.10 PTSD (POST-TRAUMATIC STRESS DISORDER): ICD-10-CM

## 2021-12-22 DIAGNOSIS — F32.1 CURRENT MODERATE EPISODE OF MAJOR DEPRESSIVE DISORDER WITHOUT PRIOR EPISODE (HCC): ICD-10-CM

## 2021-12-22 DIAGNOSIS — F43.10 POST-TRAUMATIC STRESS: ICD-10-CM

## 2021-12-22 PROCEDURE — 99214 OFFICE O/P EST MOD 30 MIN: CPT | Mod: GC | Performed by: PSYCHIATRY & NEUROLOGY

## 2021-12-22 RX ORDER — HYDROXYZINE 50 MG/1
50 TABLET, FILM COATED ORAL NIGHTLY PRN
Qty: 30 TABLET | Refills: 1 | Status: SHIPPED | OUTPATIENT
Start: 2021-12-22 | End: 2022-02-16

## 2021-12-22 RX ORDER — FLUOXETINE HYDROCHLORIDE 20 MG/1
20 CAPSULE ORAL DAILY
Qty: 30 CAPSULE | Refills: 1 | Status: SHIPPED | OUTPATIENT
Start: 2021-12-22 | End: 2022-02-16
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
## 2022-01-18 ENCOUNTER — APPOINTMENT (OUTPATIENT)
Dept: BEHAVIORAL HEALTH | Facility: CLINIC | Age: 22
End: 2022-01-18
Payer: COMMERCIAL

## 2022-01-25 ENCOUNTER — APPOINTMENT (OUTPATIENT)
Dept: BEHAVIORAL HEALTH | Facility: CLINIC | Age: 22
End: 2022-01-25
Payer: COMMERCIAL

## 2022-02-14 ENCOUNTER — APPOINTMENT (OUTPATIENT)
Dept: BEHAVIORAL HEALTH | Facility: CLINIC | Age: 22
End: 2022-02-14
Payer: COMMERCIAL

## 2022-02-16 ENCOUNTER — OFFICE VISIT (OUTPATIENT)
Dept: BEHAVIORAL HEALTH | Facility: CLINIC | Age: 22
End: 2022-02-16
Payer: COMMERCIAL

## 2022-02-16 DIAGNOSIS — F32.1 CURRENT MODERATE EPISODE OF MAJOR DEPRESSIVE DISORDER WITHOUT PRIOR EPISODE (HCC): ICD-10-CM

## 2022-02-16 DIAGNOSIS — F43.10 PTSD (POST-TRAUMATIC STRESS DISORDER): ICD-10-CM

## 2022-02-16 PROCEDURE — 99214 OFFICE O/P EST MOD 30 MIN: CPT | Mod: GC | Performed by: PSYCHIATRY & NEUROLOGY

## 2022-02-16 RX ORDER — FLUOXETINE HYDROCHLORIDE 20 MG/1
20 CAPSULE ORAL DAILY
Qty: 30 CAPSULE | Refills: 1 | Status: SHIPPED | OUTPATIENT
Start: 2022-02-16 | End: 2022-02-16

## 2022-02-16 RX ORDER — TRAZODONE HYDROCHLORIDE 50 MG/1
50 TABLET ORAL NIGHTLY
Qty: 30 TABLET | Refills: 1 | Status: SHIPPED | OUTPATIENT
Start: 2022-02-16 | End: 2022-03-30 | Stop reason: SDUPTHER

## 2022-02-16 RX ORDER — FLUOXETINE HYDROCHLORIDE 40 MG/1
40 CAPSULE ORAL DAILY
Qty: 30 CAPSULE | Refills: 1 | Status: SHIPPED | OUTPATIENT
Start: 2022-02-16 | End: 2022-03-30 | Stop reason: SDUPTHER

## 2022-02-16 ASSESSMENT — PATIENT HEALTH QUESTIONNAIRE - PHQ9
5. POOR APPETITE OR OVEREATING: 1 - SEVERAL DAYS
CLINICAL INTERPRETATION OF PHQ2 SCORE: 2
SUM OF ALL RESPONSES TO PHQ QUESTIONS 1-9: 10
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
## 2022-03-25 ENCOUNTER — OFFICE VISIT (OUTPATIENT)
Dept: BEHAVIORAL HEALTH | Facility: CLINIC | Age: 22
End: 2022-03-25
Payer: COMMERCIAL

## 2022-03-25 DIAGNOSIS — F43.10 PTSD (POST-TRAUMATIC STRESS DISORDER): ICD-10-CM

## 2022-03-25 PROCEDURE — 90791 PSYCH DIAGNOSTIC EVALUATION: CPT | Performed by: PSYCHOLOGIST

## 2022-03-25 ASSESSMENT — PATIENT HEALTH QUESTIONNAIRE - PHQ9
CLINICAL INTERPRETATION OF PHQ2 SCORE: 1
5. POOR APPETITE OR OVEREATING: 0 - NOT AT ALL
SUM OF ALL RESPONSES TO PHQ QUESTIONS 1-9: 5

## 2022-03-25 ASSESSMENT — ANXIETY QUESTIONNAIRES
4. TROUBLE RELAXING: MORE THAN HALF THE DAYS
7. FEELING AFRAID AS IF SOMETHING AWFUL MIGHT HAPPEN: NOT AT ALL
2. NOT BEING ABLE TO STOP OR CONTROL WORRYING: SEVERAL DAYS
1. FEELING NERVOUS, ANXIOUS, OR ON EDGE: SEVERAL DAYS
GAD7 TOTAL SCORE: 6
3. WORRYING TOO MUCH ABOUT DIFFERENT THINGS: SEVERAL DAYS
IF YOU CHECKED OFF ANY PROBLEMS ON THIS QUESTIONNAIRE, HOW DIFFICULT HAVE THESE PROBLEMS MADE IT FOR YOU TO DO YOUR WORK, TAKE CARE OF THINGS AT HOME, OR GET ALONG WITH OTHER PEOPLE: NOT DIFFICULT AT ALL
6. BECOMING EASILY ANNOYED OR IRRITABLE: SEVERAL DAYS
5. BEING SO RESTLESS THAT IT IS HARD TO SIT STILL: NOT AT ALL
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
## 2022-03-30 ENCOUNTER — OFFICE VISIT (OUTPATIENT)
Dept: BEHAVIORAL HEALTH | Facility: CLINIC | Age: 22
End: 2022-03-30
Payer: COMMERCIAL

## 2022-03-30 DIAGNOSIS — F43.10 PTSD (POST-TRAUMATIC STRESS DISORDER): ICD-10-CM

## 2022-03-30 DIAGNOSIS — F32.4 MAJOR DEPRESSIVE DISORDER WITH SINGLE EPISODE, IN PARTIAL REMISSION (HCC): ICD-10-CM

## 2022-03-30 PROCEDURE — 99214 OFFICE O/P EST MOD 30 MIN: CPT | Mod: GC | Performed by: PSYCHIATRY & NEUROLOGY

## 2022-03-30 RX ORDER — TRAZODONE HYDROCHLORIDE 50 MG/1
50 TABLET ORAL NIGHTLY
Qty: 30 TABLET | Refills: 5 | Status: SHIPPED | OUTPATIENT
Start: 2022-03-30 | End: 2022-05-03 | Stop reason: SDUPTHER

## 2022-03-30 RX ORDER — FLUOXETINE HYDROCHLORIDE 40 MG/1
40 CAPSULE ORAL DAILY
Qty: 90 CAPSULE | Refills: 1 | Status: SHIPPED | OUTPATIENT
Start: 2022-03-30 | End: 2022-04-06 | Stop reason: SDUPTHER
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
## 2022-04-16 DIAGNOSIS — F43.10 PTSD (POST-TRAUMATIC STRESS DISORDER): ICD-10-CM

## 2022-04-18 RX ORDER — FLUOXETINE HYDROCHLORIDE 40 MG/1
40 CAPSULE ORAL DAILY
Qty: 30 CAPSULE | Refills: 5 | Status: SHIPPED | OUTPATIENT
Start: 2022-04-18 | End: 2022-05-03 | Stop reason: SDUPTHER

## 2022-04-20 ENCOUNTER — OFFICE VISIT (OUTPATIENT)
Dept: BEHAVIORAL HEALTH | Facility: CLINIC | Age: 22
End: 2022-04-20
Payer: COMMERCIAL

## 2022-04-20 DIAGNOSIS — F43.10 PTSD (POST-TRAUMATIC STRESS DISORDER): ICD-10-CM

## 2022-04-20 DIAGNOSIS — F32.4 MAJOR DEPRESSIVE DISORDER WITH SINGLE EPISODE, IN PARTIAL REMISSION (HCC): ICD-10-CM

## 2022-04-20 PROCEDURE — 90837 PSYTX W PT 60 MINUTES: CPT | Performed by: PSYCHOLOGIST

## 2022-05-11 NOTE — PROGRESS NOTES
..  [facility]   Psychotherapy Progress Note        Name: Mackenzie Daniels  MRN: [id number]  : 2000  Age: 21 y.o.  Date of assessment: 22  PCP: Eloisa Loyola D.O.  Persons in attendance: Patient  Total session time: 54 minutes    This session took place on 22 at 2pm.  This note is being signed off on 22 due to paperwork.    Topics addressed in psychotherapy include: Today's session covered the topic of stress, triggers and moods. Discussed tools to recognize triggers, and ways to identify emotions. Therapist provided validation, support and feedback.  Therapist also provided a new tool for patient to utilize. The patient was encouraged to utilize the tool often at home and other settings.  There was no SI.      Objective Observations:   Participation:Active verbal participation   Grooming:Casual   Cognition:Alert and Fully Oriented   Eye Contact:Good   Mood:Euthymic   Affect:Congruent with content   Thought Process:Logical and Goal-directed   Speech:Rate within normal limits and Volume within normal limits    Current Risk:   Suicide: low   Homicide: low   Self-Harm: low   Relapse:    Safety Plan Needed:     Care Plan Updated: No    Does patient express agreement with the above plan? Yes     Diagnosis:  1. PTSD (post-traumatic stress disorder)    2. Major depressive disorder with single episode, in partial remission (HCC)        Follow up appointment(s) scheduled? Yes       Karla Lucas PsyD

## 2022-10-21 DIAGNOSIS — F43.10 PTSD (POST-TRAUMATIC STRESS DISORDER): ICD-10-CM

## 2022-10-21 RX ORDER — FLUOXETINE HYDROCHLORIDE 40 MG/1
40 CAPSULE ORAL DAILY
Qty: 90 CAPSULE | Refills: 1 | Status: SHIPPED | OUTPATIENT
Start: 2022-10-21 | End: 2022-10-21 | Stop reason: SDUPTHER

## 2022-10-21 RX ORDER — TRAZODONE HYDROCHLORIDE 50 MG/1
50 TABLET ORAL NIGHTLY
Qty: 90 TABLET | Refills: 1 | Status: SHIPPED | OUTPATIENT
Start: 2022-10-21 | End: 2022-10-21 | Stop reason: SDUPTHER

## 2022-10-21 RX ORDER — TRAZODONE HYDROCHLORIDE 50 MG/1
50 TABLET ORAL NIGHTLY
Qty: 90 TABLET | Refills: 1 | Status: SHIPPED | OUTPATIENT
Start: 2022-10-21

## 2022-10-21 RX ORDER — FLUOXETINE HYDROCHLORIDE 40 MG/1
40 CAPSULE ORAL DAILY
Qty: 90 CAPSULE | Refills: 1 | Status: SHIPPED | OUTPATIENT
Start: 2022-10-21

## 2022-10-29 ENCOUNTER — OFFICE VISIT (OUTPATIENT)
Dept: URGENT CARE | Facility: PHYSICIAN GROUP | Age: 22
End: 2022-10-29
Payer: COMMERCIAL

## 2022-10-29 VITALS
SYSTOLIC BLOOD PRESSURE: 120 MMHG | HEIGHT: 67 IN | OXYGEN SATURATION: 99 % | BODY MASS INDEX: 27.47 KG/M2 | TEMPERATURE: 97.4 F | WEIGHT: 175 LBS | RESPIRATION RATE: 12 BRPM | DIASTOLIC BLOOD PRESSURE: 82 MMHG | HEART RATE: 91 BPM

## 2022-10-29 DIAGNOSIS — J01.00 ACUTE NON-RECURRENT MAXILLARY SINUSITIS: ICD-10-CM

## 2022-10-29 PROCEDURE — 99213 OFFICE O/P EST LOW 20 MIN: CPT | Performed by: PHYSICIAN ASSISTANT

## 2022-10-29 RX ORDER — DOXYCYCLINE HYCLATE 100 MG
100 TABLET ORAL 2 TIMES DAILY
Qty: 14 TABLET | Refills: 0 | Status: SHIPPED | OUTPATIENT
Start: 2022-10-29 | End: 2022-11-05

## 2022-10-29 NOTE — PROGRESS NOTES
"Subjective:   Mackenzie Daniels is a 22 y.o. female who presents for Sinus Problem (X2weeks sinus, congestion, nose burning, headaches)      HPI  The patient presents to the Urgent Care with complaints of a sinus infection onset 2 weeks ago.  Symptoms of nasal congestion, frontal headaches, facial pain, sinus pain.  She has tried cold medicine, allergy medicine, and other over-the-counter medicines with minimal to no relief.  Her symptoms began to improve and then worsened. Denies any fever, chills, cough, chest pain, SOB, vision changes, vomiting, diarrhea.       Medications:    fluoxetine  traZODone Tabs    Allergies: Amoxicillin    Problem List: Mackenzie Daniels does not have any pertinent problems on file.    Surgical History:  No past surgical history on file.    Past Social Hx: Mackenzie Daniels  reports that she has never smoked. She has never used smokeless tobacco. She reports current alcohol use. She reports that she does not currently use drugs.     Past Family Hx:  Mackenzie Daniels family history includes Arthritis in her mother; Cancer in her maternal grandmother; Diabetes in her maternal grandmother; Heart Disease in her maternal grandmother; Hyperlipidemia in her maternal grandmother; Hypertension in her maternal grandmother; No Known Problems in her brother, father, and sister; Other in her mother.     Problem list, medications, and allergies reviewed by myself today in Epic.     Objective:     /82 (BP Location: Left arm, Patient Position: Sitting, BP Cuff Size: Adult)   Pulse 91   Temp 36.3 °C (97.4 °F) (Temporal)   Resp 12   Ht 1.702 m (5' 7\")   Wt 79.4 kg (175 lb)   SpO2 99%   BMI 27.41 kg/m²     Physical Exam  Vitals reviewed.   Constitutional:       General: She is not in acute distress.     Appearance: Normal appearance. She is not ill-appearing or toxic-appearing.   HENT:      Right Ear: Tympanic membrane normal.      Left Ear: Tympanic membrane normal.      Nose: Mucosal edema and rhinorrhea " present. Rhinorrhea is purulent.      Right Sinus: Maxillary sinus tenderness present. No frontal sinus tenderness.      Left Sinus: Maxillary sinus tenderness present. No frontal sinus tenderness.      Mouth/Throat:      Mouth: Mucous membranes are moist.      Pharynx: Oropharynx is clear. No oropharyngeal exudate or posterior oropharyngeal erythema.   Eyes:      Conjunctiva/sclera: Conjunctivae normal.      Pupils: Pupils are equal, round, and reactive to light.   Cardiovascular:      Rate and Rhythm: Normal rate and regular rhythm.      Heart sounds: Normal heart sounds.   Pulmonary:      Effort: Pulmonary effort is normal. No respiratory distress.      Breath sounds: Normal breath sounds. No wheezing, rhonchi or rales.   Musculoskeletal:      Cervical back: Neck supple.   Skin:     General: Skin is warm and dry.   Neurological:      General: No focal deficit present.      Mental Status: She is alert and oriented to person, place, and time.   Psychiatric:         Mood and Affect: Mood normal.         Behavior: Behavior normal.       Diagnosis and associated orders:     1. Acute non-recurrent maxillary sinusitis  - doxycycline (VIBRAMYCIN) 100 MG Tab; Take 1 Tablet by mouth 2 times a day for 7 days.  Dispense: 14 Tablet; Refill: 0     Comments/MDM:     Patient's presenting symptoms and exam findings are consistent with acute bacterial sinusitis.  Patient with double sickness, no improvement with over-the-counter medications, and well over 10 days of symptoms.  Therefore, I feel it is appropriate to initiate antibiotic therapy at this time.  Encouraged increased fluid intake, nasal saline washes, Flonase nasal spray, over-the-counter antihistamine.  She may try over-the-counter decongestants as well.       I personally reviewed prior external notes and test results pertinent to today's visit. Pathogenesis of diagnosis discussed including typical length and natural progression. Supportive care, natural history,  differential diagnoses, and indications for immediate follow-up discussed. Patient expresses understanding and agrees to plan. Patient denies any other questions or concerns.     Follow-up with the primary care physician for recheck, reevaluation, and consideration of further management.    Please note that this dictation was created using voice recognition software. I have made a reasonable attempt to correct obvious errors, but I expect that there are errors of grammar and possibly content that I did not discover before finalizing the note.    This note was electronically signed by Shaun Choudhury PA-C

## 2022-11-02 ENCOUNTER — PATIENT MESSAGE (OUTPATIENT)
Dept: MEDICAL GROUP | Facility: PHYSICIAN GROUP | Age: 22
End: 2022-11-02
Payer: COMMERCIAL

## 2022-11-02 DIAGNOSIS — J01.00 ACUTE NON-RECURRENT MAXILLARY SINUSITIS: ICD-10-CM

## 2022-11-03 RX ORDER — LEVOFLOXACIN 500 MG/1
500 TABLET, FILM COATED ORAL DAILY
Qty: 5 TABLET | Refills: 0 | Status: SHIPPED | OUTPATIENT
Start: 2022-11-03 | End: 2022-11-08

## 2022-11-04 NOTE — PROGRESS NOTES
Patient allergic to amoxicillin, was prescribed doxycycline and was able to take for 3 days before developing pruritus and discontinued the medication with resolution of symptoms.  Will treat with 5 days of Levaquin 500 mg daily.

## 2023-02-18 ENCOUNTER — PATIENT MESSAGE (OUTPATIENT)
Dept: MEDICAL GROUP | Facility: PHYSICIAN GROUP | Age: 23
End: 2023-02-18
Payer: COMMERCIAL

## 2023-02-18 DIAGNOSIS — Z32.01 PREGNANCY TEST POSITIVE: ICD-10-CM

## 2023-02-24 ENCOUNTER — HOSPITAL ENCOUNTER (EMERGENCY)
Facility: MEDICAL CENTER | Age: 23
End: 2023-02-24
Attending: EMERGENCY MEDICINE
Payer: COMMERCIAL

## 2023-02-24 VITALS
WEIGHT: 194 LBS | BODY MASS INDEX: 30.45 KG/M2 | DIASTOLIC BLOOD PRESSURE: 63 MMHG | HEIGHT: 67 IN | OXYGEN SATURATION: 97 % | HEART RATE: 87 BPM | SYSTOLIC BLOOD PRESSURE: 118 MMHG | TEMPERATURE: 98.2 F | RESPIRATION RATE: 20 BRPM

## 2023-02-24 DIAGNOSIS — R11.2 NAUSEA AND VOMITING, UNSPECIFIED VOMITING TYPE: ICD-10-CM

## 2023-02-24 DIAGNOSIS — Z34.90 PREGNANCY, UNSPECIFIED GESTATIONAL AGE: ICD-10-CM

## 2023-02-24 DIAGNOSIS — N39.0 URINARY TRACT INFECTION WITHOUT HEMATURIA, SITE UNSPECIFIED: ICD-10-CM

## 2023-02-24 LAB
ALBUMIN SERPL BCP-MCNC: 4.6 G/DL (ref 3.2–4.9)
ALBUMIN/GLOB SERPL: 1.3 G/DL
ALP SERPL-CCNC: 45 U/L (ref 30–99)
ALT SERPL-CCNC: 39 U/L (ref 2–50)
ANION GAP SERPL CALC-SCNC: 14 MMOL/L (ref 7–16)
APPEARANCE UR: ABNORMAL
AST SERPL-CCNC: 25 U/L (ref 12–45)
BACTERIA #/AREA URNS HPF: ABNORMAL /HPF
BASOPHILS # BLD AUTO: 0.4 % (ref 0–1.8)
BASOPHILS # BLD: 0.06 K/UL (ref 0–0.12)
BILIRUB SERPL-MCNC: 0.4 MG/DL (ref 0.1–1.5)
BILIRUB UR QL STRIP.AUTO: NEGATIVE
BUN SERPL-MCNC: 6 MG/DL (ref 8–22)
CALCIUM ALBUM COR SERPL-MCNC: 9.2 MG/DL (ref 8.5–10.5)
CALCIUM SERPL-MCNC: 9.7 MG/DL (ref 8.5–10.5)
CHLORIDE SERPL-SCNC: 103 MMOL/L (ref 96–112)
CO2 SERPL-SCNC: 20 MMOL/L (ref 20–33)
COLOR UR: YELLOW
CREAT SERPL-MCNC: 0.48 MG/DL (ref 0.5–1.4)
EOSINOPHIL # BLD AUTO: 0.02 K/UL (ref 0–0.51)
EOSINOPHIL NFR BLD: 0.1 % (ref 0–6.9)
EPI CELLS #/AREA URNS HPF: ABNORMAL /HPF
ERYTHROCYTE [DISTWIDTH] IN BLOOD BY AUTOMATED COUNT: 43.3 FL (ref 35.9–50)
GFR SERPLBLD CREATININE-BSD FMLA CKD-EPI: 137 ML/MIN/1.73 M 2
GLOBULIN SER CALC-MCNC: 3.6 G/DL (ref 1.9–3.5)
GLUCOSE SERPL-MCNC: 83 MG/DL (ref 65–99)
GLUCOSE UR STRIP.AUTO-MCNC: NEGATIVE MG/DL
HCG SERPL QL: POSITIVE
HCT VFR BLD AUTO: 41 % (ref 37–47)
HGB BLD-MCNC: 13.8 G/DL (ref 12–16)
HYALINE CASTS #/AREA URNS LPF: ABNORMAL /LPF
IMM GRANULOCYTES # BLD AUTO: 0.08 K/UL (ref 0–0.11)
IMM GRANULOCYTES NFR BLD AUTO: 0.6 % (ref 0–0.9)
KETONES UR STRIP.AUTO-MCNC: 80 MG/DL
LEUKOCYTE ESTERASE UR QL STRIP.AUTO: ABNORMAL
LIPASE SERPL-CCNC: 28 U/L (ref 11–82)
LYMPHOCYTES # BLD AUTO: 1.7 K/UL (ref 1–4.8)
LYMPHOCYTES NFR BLD: 12.1 % (ref 22–41)
MCH RBC QN AUTO: 28.3 PG (ref 27–33)
MCHC RBC AUTO-ENTMCNC: 33.7 G/DL (ref 33.6–35)
MCV RBC AUTO: 84 FL (ref 81.4–97.8)
MICRO URNS: ABNORMAL
MONOCYTES # BLD AUTO: 0.87 K/UL (ref 0–0.85)
MONOCYTES NFR BLD AUTO: 6.2 % (ref 0–13.4)
NEUTROPHILS # BLD AUTO: 11.28 K/UL (ref 2–7.15)
NEUTROPHILS NFR BLD: 80.6 % (ref 44–72)
NITRITE UR QL STRIP.AUTO: NEGATIVE
NRBC # BLD AUTO: 0 K/UL
NRBC BLD-RTO: 0 /100 WBC
PH UR STRIP.AUTO: 7 [PH] (ref 5–8)
PLATELET # BLD AUTO: 324 K/UL (ref 164–446)
PMV BLD AUTO: 9.2 FL (ref 9–12.9)
POTASSIUM SERPL-SCNC: 3.9 MMOL/L (ref 3.6–5.5)
PROT SERPL-MCNC: 8.2 G/DL (ref 6–8.2)
PROT UR QL STRIP: NEGATIVE MG/DL
RBC # BLD AUTO: 4.88 M/UL (ref 4.2–5.4)
RBC # URNS HPF: ABNORMAL /HPF
RBC UR QL AUTO: NEGATIVE
SODIUM SERPL-SCNC: 137 MMOL/L (ref 135–145)
SP GR UR STRIP.AUTO: 1.02
UROBILINOGEN UR STRIP.AUTO-MCNC: 0.2 MG/DL
WBC # BLD AUTO: 14 K/UL (ref 4.8–10.8)
WBC #/AREA URNS HPF: ABNORMAL /HPF

## 2023-02-24 PROCEDURE — 700111 HCHG RX REV CODE 636 W/ 250 OVERRIDE (IP): Performed by: EMERGENCY MEDICINE

## 2023-02-24 PROCEDURE — 700105 HCHG RX REV CODE 258: Performed by: EMERGENCY MEDICINE

## 2023-02-24 PROCEDURE — 84703 CHORIONIC GONADOTROPIN ASSAY: CPT

## 2023-02-24 PROCEDURE — 99284 EMERGENCY DEPT VISIT MOD MDM: CPT

## 2023-02-24 PROCEDURE — 87086 URINE CULTURE/COLONY COUNT: CPT

## 2023-02-24 PROCEDURE — 36415 COLL VENOUS BLD VENIPUNCTURE: CPT

## 2023-02-24 PROCEDURE — 83690 ASSAY OF LIPASE: CPT

## 2023-02-24 PROCEDURE — 85025 COMPLETE CBC W/AUTO DIFF WBC: CPT

## 2023-02-24 PROCEDURE — 81001 URINALYSIS AUTO W/SCOPE: CPT

## 2023-02-24 PROCEDURE — 80053 COMPREHEN METABOLIC PANEL: CPT

## 2023-02-24 PROCEDURE — 96374 THER/PROPH/DIAG INJ IV PUSH: CPT

## 2023-02-24 RX ORDER — ONDANSETRON 4 MG/1
4 TABLET, ORALLY DISINTEGRATING ORAL EVERY 8 HOURS PRN
Qty: 9 TABLET | Refills: 0 | Status: SHIPPED | OUTPATIENT
Start: 2023-02-24

## 2023-02-24 RX ORDER — SODIUM CHLORIDE 9 MG/ML
1000 INJECTION, SOLUTION INTRAVENOUS ONCE
Status: COMPLETED | OUTPATIENT
Start: 2023-02-24 | End: 2023-02-24

## 2023-02-24 RX ORDER — ONDANSETRON 2 MG/ML
4 INJECTION INTRAMUSCULAR; INTRAVENOUS ONCE
Status: COMPLETED | OUTPATIENT
Start: 2023-02-24 | End: 2023-02-24

## 2023-02-24 RX ORDER — NITROFURANTOIN 25; 75 MG/1; MG/1
100 CAPSULE ORAL 2 TIMES DAILY
Qty: 10 CAPSULE | Refills: 0 | Status: ACTIVE | OUTPATIENT
Start: 2023-02-24 | End: 2023-03-01

## 2023-02-24 RX ADMIN — SODIUM CHLORIDE 1000 ML: 9 INJECTION, SOLUTION INTRAVENOUS at 21:03

## 2023-02-24 RX ADMIN — ONDANSETRON 4 MG: 2 INJECTION INTRAMUSCULAR; INTRAVENOUS at 21:02

## 2023-02-25 NOTE — ED PROVIDER NOTES
ED Provider Note    CHIEF COMPLAINT  Chief Complaint   Patient presents with    N/V     PT reports she has had NV x 2 days. PT reports she had a positive at home pregnancy test 2 weeks prior, estimates she is 6 weeks pregnant at this time, not yet verified with OBGYN. PT denies abdominal pain, denies vaginal cramping or bleeding.        EXTERNAL RECORDS REVIEWED  Outpatient Notes Office visit 10/29/22    HPI/ROS  LIMITATION TO HISTORY   Select: : None  OUTSIDE HISTORIAN(S):  Family Mom    Mackenzie Daniels is a 22 y.o. female who presents to the emergency department for evaluation of nausea and vomiting.  The patient states that she recently found out that she was pregnant.  This is her first pregnancy.  She states that today she started having vomiting.  She has vomited multiple times throughout the day but states that it is nonbloody and nonbilious.  She denies any vaginal bleeding or discharge.  She denies any abdominal pain whatsoever.  She has not had any diarrhea.  She denies any fevers.  She denies any previous abdominal surgeries.  She has not had any dysuria or hematuria.  She states that she does have an OB/GYN appointment within the next 1 to 2 weeks.    PAST MEDICAL HISTORY   has a past medical history of Allergy, Anxiety, Arthritis, Depression, Hyperlipidemia, Syncope, and Urinary tract infection, site not specified.    SURGICAL HISTORY  patient denies any surgical history    FAMILY HISTORY  Family History   Problem Relation Age of Onset    Arthritis Mother     Other Mother         ovarian cyst    No Known Problems Father     No Known Problems Sister     No Known Problems Brother     Diabetes Maternal Grandmother     Heart Disease Maternal Grandmother     Hypertension Maternal Grandmother     Hyperlipidemia Maternal Grandmother     Cancer Maternal Grandmother         cervical       SOCIAL HISTORY  Social History     Tobacco Use    Smoking status: Never    Smokeless tobacco: Never   Vaping Use     "Vaping Use: Never used   Substance and Sexual Activity    Alcohol use: Not Currently     Comment: rare    Drug use: Not Currently    Sexual activity: Yes     Partners: Male     Birth control/protection: Condom, Condom Male     Comment: 1 partner       CURRENT MEDICATIONS  Home Medications       Reviewed by Sierra Aviles R.N. (Registered Nurse) on 02/24/23 at 1850  Med List Status: Partial     Medication Last Dose Status   fluoxetine (PROZAC) 40 MG capsule  Active   traZODone (DESYREL) 50 MG Tab  Active                    ALLERGIES  Allergies   Allergen Reactions    Amoxicillin Hives    Doxycycline Itching       PHYSICAL EXAM  VITAL SIGNS: /70   Pulse 94   Temp 36.2 °C (97.2 °F) (Temporal)   Resp 17   Ht 1.702 m (5' 7\")   Wt 88 kg (194 lb 0.1 oz)   LMP 01/15/2023 Comment: pregnancy not yet verified with MD  SpO2 97%   BMI 30.39 kg/m²   Constitutional: Alert and in no apparent distress.  HENT: Normocephalic atraumatic. Bilateral external ears normal. Nose normal. Mucous membranes are moist.  Eyes: Pupils are equal and reactive. Conjunctiva normal. Non-icteric sclera.   Neck: Normal range of motion without tenderness. Supple. No meningeal signs.  Cardiovascular: Regular rate and rhythm. No murmurs, gallops or rubs.  Thorax & Lungs: No retractions, nasal flaring, or tachypnea. Breath sounds are clear to auscultation bilaterally. No wheezing, rhonchi or rales.  Abdomen: Soft, nontender and nondistended. No hepatosplenomegaly.  Skin: Warm and dry. No rashes are noted.  Back: No bony tenderness, No CVA tenderness.   Extremities: 2+ peripheral pulses. Cap refill is less than 2 seconds. No edema, cyanosis, or clubbing.  Musculoskeletal: Good range of motion in all major joints. No tenderness to palpation or major deformities noted.   Neurologic: Alert and oriented x 3. The patient moves all 4 extremities without obvious deficits.    DIAGNOSTIC STUDIES / PROCEDURES    LABS  Results for orders placed or " performed during the hospital encounter of 02/24/23   CBC WITH DIFFERENTIAL   Result Value Ref Range    WBC 14.0 (H) 4.8 - 10.8 K/uL    RBC 4.88 4.20 - 5.40 M/uL    Hemoglobin 13.8 12.0 - 16.0 g/dL    Hematocrit 41.0 37.0 - 47.0 %    MCV 84.0 81.4 - 97.8 fL    MCH 28.3 27.0 - 33.0 pg    MCHC 33.7 33.6 - 35.0 g/dL    RDW 43.3 35.9 - 50.0 fL    Platelet Count 324 164 - 446 K/uL    MPV 9.2 9.0 - 12.9 fL    Neutrophils-Polys 80.60 (H) 44.00 - 72.00 %    Lymphocytes 12.10 (L) 22.00 - 41.00 %    Monocytes 6.20 0.00 - 13.40 %    Eosinophils 0.10 0.00 - 6.90 %    Basophils 0.40 0.00 - 1.80 %    Immature Granulocytes 0.60 0.00 - 0.90 %    Nucleated RBC 0.00 /100 WBC    Neutrophils (Absolute) 11.28 (H) 2.00 - 7.15 K/uL    Lymphs (Absolute) 1.70 1.00 - 4.80 K/uL    Monos (Absolute) 0.87 (H) 0.00 - 0.85 K/uL    Eos (Absolute) 0.02 0.00 - 0.51 K/uL    Baso (Absolute) 0.06 0.00 - 0.12 K/uL    Immature Granulocytes (abs) 0.08 0.00 - 0.11 K/uL    NRBC (Absolute) 0.00 K/uL   COMP METABOLIC PANEL   Result Value Ref Range    Sodium 137 135 - 145 mmol/L    Potassium 3.9 3.6 - 5.5 mmol/L    Chloride 103 96 - 112 mmol/L    Co2 20 20 - 33 mmol/L    Anion Gap 14.0 7.0 - 16.0    Glucose 83 65 - 99 mg/dL    Bun 6 (L) 8 - 22 mg/dL    Creatinine 0.48 (L) 0.50 - 1.40 mg/dL    Calcium 9.7 8.5 - 10.5 mg/dL    AST(SGOT) 25 12 - 45 U/L    ALT(SGPT) 39 2 - 50 U/L    Alkaline Phosphatase 45 30 - 99 U/L    Total Bilirubin 0.4 0.1 - 1.5 mg/dL    Albumin 4.6 3.2 - 4.9 g/dL    Total Protein 8.2 6.0 - 8.2 g/dL    Globulin 3.6 (H) 1.9 - 3.5 g/dL    A-G Ratio 1.3 g/dL   LIPASE   Result Value Ref Range    Lipase 28 11 - 82 U/L   URINALYSIS CULTURE, IF INDICATED    Specimen: Urine, Clean Catch; Blood   Result Value Ref Range    Color Yellow     Character Cloudy (A)     Specific Gravity 1.021 <1.035    Ph 7.0 5.0 - 8.0    Glucose Negative Negative mg/dL    Ketones 80 (A) Negative mg/dL    Protein Negative Negative mg/dL    Bilirubin Negative Negative     Urobilinogen, Urine 0.2 Negative    Nitrite Negative Negative    Leukocyte Esterase Large (A) Negative    Occult Blood Negative Negative    Micro Urine Req Microscopic    HCG QUAL SERUM   Result Value Ref Range    Beta-Hcg Qualitative Serum Positive (A) Negative   URINE MICROSCOPIC (W/UA)   Result Value Ref Range    -150 (A) /hpf    RBC 0-2 /hpf    Bacteria Moderate (A) None /hpf    Epithelial Cells Moderate (A) /hpf    Hyaline Cast 6-10 (A) /lpf   URINE CULTURE(NEW)    Specimen: Urine   Result Value Ref Range    Significant Indicator NEG     Source UR     Site -     Culture Result -    CORRECTED CALCIUM   Result Value Ref Range    Correct Calcium 9.2 8.5 - 10.5 mg/dL   ESTIMATED GFR   Result Value Ref Range    GFR (CKD-EPI) 137 >60 mL/min/1.73 m 2     COURSE & MEDICAL DECISION MAKING    ED Observation Status? Yes; I am placing the patient in to an observation status due to a diagnostic uncertainty as well as therapeutic intensity. Patient placed in observation status at 8:51 PM, 2023.     Observation plan is as follows: Labs, IV fluids, Zofran, and reassessment    Upon Reevaluation, the patient's condition has: Improved; and will be discharged.    Patient discharged from ED Observation status at 10:14 PM (Time) 23 (Date).     INITIAL ASSESSMENT, COURSE AND PLAN  Care Narrative: This is a  22-year-old female presenting to the emergency department for evaluation of nausea and vomiting.  On initial evaluation, the patient did not appear to be in any acute distress.  Her vital signs were normal and reassuring.  Physical exam was also reassuring with a completely benign abdomen.  The patient denied any vaginal bleeding or discharge and I do not think that pelvic exam is indicated at this point.  Additionally, given her lack of abdominal pain per history or tenderness on exam, I do not think that she requires an ultrasound at this point.  She does have a follow-up appointment with her OB/GYN within  the next 2 weeks.    An IV was established and labs were sent.  Pregnancy test was positive.  White count was slightly elevated at 14 but I suspect this is more likely reactive secondary to her persistent vomiting.  Electrolytes without significant derangement.  Lipase and LFTs were also within normal limits.  Urinalysis was notable for large leukocyte esterase and  WBCs.  No blood was noted.  The patient had no CVA tenderness, fever, or abdominal pain.  I am less concern for pyelonephritis or kidney stone at this point.    Since the patient is pregnant she will be treated with Macrobid.  She was observed in the emergency department and given IV fluids.  She tolerated an oral challenge with no additional episodes of emesis.  I do think she is stable for discharge at this time.  She has a follow-up appointment with her OB/GYN which I encouraged her to keep.  She is given a prescription for Zofran to go home with.  I encouraged her to take prenatal vitamins and to follow-up with her primary care physician as well.  She understands return to the ED with any worsening signs or symptoms.    HYDRATION: Based on the patient's presentation of Acute Vomiting the patient was given IV fluids. IV Hydration was used because oral hydration was not adequate alone. Upon recheck following hydration, the patient was improved.      ADDITIONAL PROBLEM LIST  Pregnancy, urinary tract infection, nausea vomiting  DISPOSITION AND DISCUSSIONS  I have discussed management of the patient with the following physicians and ZANE's: None    Discussion of management with other QHP or appropriate source(s): None     Escalation of care considered, and ultimately not performed:IV fluids, blood analysis, and acute inpatient care management, however at this time, the patient is most appropriate for outpatient management    Barriers to care at this time, including but not limited to:  None .     Decision tools and prescription drugs considered  including, but not limited to: Antibiotics Macrobid .    FINAL IMPRESSION  1. Urinary tract infection without hematuria, site unspecified    2. Pregnancy, unspecified gestational age    3. Nausea and vomiting, unspecified vomiting type      PRESCRIPTIONS  New Prescriptions    NITROFURANTOIN (MACROBID) 100 MG CAP    Take 1 Capsule by mouth 2 times a day for 5 days.    ONDANSETRON (ZOFRAN ODT) 4 MG TABLET DISPERSIBLE    Take 1 Tablet by mouth every 8 hours as needed for Nausea/Vomiting.     FOLLOW UP  Eloisa Loyola D.O.  [address] [place] [id number] [phone]    Call in 1 day  To schedule a follow up appointment    Please keep your scheduled OB/GYN appointment          [facility], Emergency Dept  [address] [facility] [address] [phone]  Go to   As needed    -DISCHARGE-    Electronically signed by: Maddie White D.O., 2/24/2023 8:48 PM

## 2023-02-25 NOTE — ED NOTES
Pt dc home w/ prescription and dc instruction. Pt ambulatory w/ steady gait. Denies n/v at time of DC

## 2023-02-25 NOTE — ED NOTES
Ns infusion in process. Zofran administered for n/v. Pt able to ambulate to restroom, provided urine sample, sent to lab for analysis. Blood sample senbt to lab for analysis

## 2023-02-25 NOTE — ED TRIAGE NOTES
Chief Complaint   Patient presents with    N/V     PT reports she has had NV x 2 days. PT reports she had a positive at home pregnancy test 2 weeks prior, estimates she is 6 weeks pregnant at this time, not yet verified with OBGYN. PT denies abdominal pain, denies vaginal cramping or bleeding.      PT ambulatory to triage for above complaint. GCS 15. PT not actively nauseous or vomiting at this time.     Pt is alert and oriented, speaking in full sentences, follows commands and responds appropriately to questions. NAD. Resp are even and unlabored.      Pt placed in lobby. Pt educated on triage process. Pt encouraged to alert staff for any changes.     Patient and staff wearing appropriate PPE

## 2023-02-26 LAB
BACTERIA UR CULT: NORMAL
SIGNIFICANT IND 70042: NORMAL
SITE SITE: NORMAL
SOURCE SOURCE: NORMAL

## 2023-04-23 ENCOUNTER — HOSPITAL ENCOUNTER (OUTPATIENT)
Facility: MEDICAL CENTER | Age: 23
End: 2023-04-23
Attending: PHYSICIAN ASSISTANT
Payer: COMMERCIAL

## 2023-04-23 ENCOUNTER — HOSPITAL ENCOUNTER (EMERGENCY)
Facility: MEDICAL CENTER | Age: 23
End: 2023-04-23
Payer: COMMERCIAL

## 2023-04-23 ENCOUNTER — OFFICE VISIT (OUTPATIENT)
Dept: URGENT CARE | Facility: PHYSICIAN GROUP | Age: 23
End: 2023-04-23
Payer: COMMERCIAL

## 2023-04-23 VITALS
RESPIRATION RATE: 20 BRPM | TEMPERATURE: 97 F | HEART RATE: 78 BPM | SYSTOLIC BLOOD PRESSURE: 102 MMHG | DIASTOLIC BLOOD PRESSURE: 68 MMHG | HEIGHT: 67 IN | BODY MASS INDEX: 28.72 KG/M2 | WEIGHT: 183 LBS | OXYGEN SATURATION: 97 %

## 2023-04-23 VITALS
HEART RATE: 80 BPM | SYSTOLIC BLOOD PRESSURE: 121 MMHG | RESPIRATION RATE: 18 BRPM | TEMPERATURE: 97 F | BODY MASS INDEX: 28.73 KG/M2 | WEIGHT: 183.42 LBS | DIASTOLIC BLOOD PRESSURE: 70 MMHG | OXYGEN SATURATION: 99 %

## 2023-04-23 DIAGNOSIS — R51.9 ACUTE NONINTRACTABLE HEADACHE, UNSPECIFIED HEADACHE TYPE: ICD-10-CM

## 2023-04-23 DIAGNOSIS — Z3A.13 13 WEEKS GESTATION OF PREGNANCY: ICD-10-CM

## 2023-04-23 DIAGNOSIS — O26.812 PREGNANCY RELATED FATIGUE IN SECOND TRIMESTER: ICD-10-CM

## 2023-04-23 LAB
ALBUMIN SERPL BCP-MCNC: 4.3 G/DL (ref 3.2–4.9)
ALBUMIN/GLOB SERPL: 1.5 G/DL
ALP SERPL-CCNC: 41 U/L (ref 30–99)
ALT SERPL-CCNC: 11 U/L (ref 2–50)
ANION GAP SERPL CALC-SCNC: 13 MMOL/L (ref 7–16)
APPEARANCE UR: CLEAR
AST SERPL-CCNC: 13 U/L (ref 12–45)
BILIRUB SERPL-MCNC: 0.3 MG/DL (ref 0.1–1.5)
BILIRUB UR STRIP-MCNC: NEGATIVE MG/DL
BUN SERPL-MCNC: 4 MG/DL (ref 8–22)
CALCIUM ALBUM COR SERPL-MCNC: 8.8 MG/DL (ref 8.5–10.5)
CALCIUM SERPL-MCNC: 9 MG/DL (ref 8.5–10.5)
CHLORIDE SERPL-SCNC: 104 MMOL/L (ref 96–112)
CO2 SERPL-SCNC: 20 MMOL/L (ref 20–33)
COLOR UR AUTO: YELLOW
CREAT SERPL-MCNC: 0.43 MG/DL (ref 0.5–1.4)
ERYTHROCYTE [DISTWIDTH] IN BLOOD BY AUTOMATED COUNT: 43.6 FL (ref 35.9–50)
GFR SERPLBLD CREATININE-BSD FMLA CKD-EPI: 140 ML/MIN/1.73 M 2
GLOBULIN SER CALC-MCNC: 2.8 G/DL (ref 1.9–3.5)
GLUCOSE SERPL-MCNC: 86 MG/DL (ref 65–99)
GLUCOSE UR STRIP.AUTO-MCNC: NEGATIVE MG/DL
HCT VFR BLD AUTO: 36.9 % (ref 37–47)
HGB BLD-MCNC: 12.7 G/DL (ref 12–16)
KETONES UR STRIP.AUTO-MCNC: NEGATIVE MG/DL
LEUKOCYTE ESTERASE UR QL STRIP.AUTO: ABNORMAL
MCH RBC QN AUTO: 29.5 PG (ref 27–33)
MCHC RBC AUTO-ENTMCNC: 34.4 G/DL (ref 33.6–35)
MCV RBC AUTO: 85.6 FL (ref 81.4–97.8)
NITRITE UR QL STRIP.AUTO: NEGATIVE
PH UR STRIP.AUTO: 8.5 [PH] (ref 5–8)
PLATELET # BLD AUTO: 268 K/UL (ref 164–446)
PMV BLD AUTO: 10 FL (ref 9–12.9)
POTASSIUM SERPL-SCNC: 4.1 MMOL/L (ref 3.6–5.5)
PROT SERPL-MCNC: 7.1 G/DL (ref 6–8.2)
PROT UR QL STRIP: NEGATIVE MG/DL
RBC # BLD AUTO: 4.31 M/UL (ref 4.2–5.4)
RBC UR QL AUTO: ABNORMAL
SODIUM SERPL-SCNC: 137 MMOL/L (ref 135–145)
SP GR UR STRIP.AUTO: 1.02
UROBILINOGEN UR STRIP-MCNC: 0.2 MG/DL
WBC # BLD AUTO: 7.1 K/UL (ref 4.8–10.8)

## 2023-04-23 PROCEDURE — 80053 COMPREHEN METABOLIC PANEL: CPT

## 2023-04-23 PROCEDURE — 85027 COMPLETE CBC AUTOMATED: CPT

## 2023-04-23 PROCEDURE — 81002 URINALYSIS NONAUTO W/O SCOPE: CPT | Performed by: PHYSICIAN ASSISTANT

## 2023-04-23 PROCEDURE — 99214 OFFICE O/P EST MOD 30 MIN: CPT | Performed by: PHYSICIAN ASSISTANT

## 2023-04-23 PROCEDURE — 302449 STATCHG TRIAGE ONLY (STATISTIC)

## 2023-04-23 ASSESSMENT — ENCOUNTER SYMPTOMS
NAUSEA: 1
EYES NEGATIVE: 1
DIZZINESS: 0
VOMITING: 0
FEVER: 0
BLOOD IN STOOL: 0
PSYCHIATRIC NEGATIVE: 1
MUSCULOSKELETAL NEGATIVE: 1
HEADACHES: 1
DIARRHEA: 0
CARDIOVASCULAR NEGATIVE: 1
CHILLS: 0

## 2023-04-23 ASSESSMENT — FIBROSIS 4 INDEX
FIB4 SCORE: 0.27
FIB4 SCORE: 0.27

## 2023-04-23 NOTE — LETTER
April 23, 2023         Patient: Mackenzie Daniels   YOB: 2000   Date of Visit: 4/23/2023           To Whom it May Concern:    Mackenzie Daniels was seen in my clinic on 4/23/2023. Please excuse any absences from work.    If you have any questions or concerns, please don't hesitate to call.        Sincerely,           Cristofer Stubbs P.A.-C.  Electronically Signed

## 2023-04-23 NOTE — PROGRESS NOTES
Subjective     Mackenzie Daniels is a very pleasant 22 y.o. female accompanied by her mother who presents with Headache (Headache,x1 week/13 weeks pregnant )            HPI  Patient with headache, nausea and fatigue for the past week.  She has felt some lightheadedness and head rushes while standing at work.  Patient is 13 weeks pregnant.  She denies fever, vomiting, blurry vision, chest pain, palpitations, shortness of breath, urinary symptoms.  No abdominal pain vaginal discharge or abnormal vaginal bleeding.  Patient has been taking her prenatal vitamin.  Prenatal care up to this point has been routine and normal.  No previous history of headaches.  No other pertinent past medical history including thyroid, diabetes or anemia.  She does have a history of vitamin D deficiency but has been taking her vitamins as prescribed.  She is increasing her amount of fluids and denies urinary symptoms.  She does report that her caloric intake has been poor at best.  She has not been very hungry secondary to nausea and she has not been eating the healthiest foods.  Recently treated with clindamycin for group B strep UTI.  Mother does note that patient looks a bit fatigued and pale.  She has not taken anything for her symptoms.        PMH:  has a past medical history of Allergy, Anxiety, Arthritis, Depression, Hyperlipidemia, Syncope, and Urinary tract infection, site not specified.    She has no past medical history of Addisons disease ([facility]), Adrenal disorder ([facility]), Anemia, Arrhythmia, Asthma, Blood transfusion without reported diagnosis, Cancer ([facility]), Cataract, CHF (congestive heart failure) ([facility]), Clotting disorder ([facility]), COPD (chronic obstructive pulmonary disease) ([facility]), Cushings syndrome ([facility]), Diabetes ([facility]), Diabetic neuropathy ([facility]), GERD (gastroesophageal reflux disease), Glaucoma, Goiter, Head ache, Heart attack ([facility]), Heart murmur, HIV (human immunodeficiency virus infection) ([facility]), Hypertension, IBD (inflammatory  "bowel disease), Kidney disease, Meningitis, Migraine, Muscle disorder, Osteoporosis, Parathyroid disorder (HCC), Pituitary disease (HCC), Pulmonary emphysema (HCC), Seizure (HCC), Sickle cell disease (HCC), Stroke (HCC), Substance abuse (HCC), Thyroid disease, or Tuberculosis.  MEDS:   Current Outpatient Medications:     ondansetron (ZOFRAN ODT) 4 MG TABLET DISPERSIBLE, Take 1 Tablet by mouth every 8 hours as needed for Nausea/Vomiting., Disp: 9 Tablet, Rfl: 0    fluoxetine (PROZAC) 40 MG capsule, Take 1 Capsule by mouth every day., Disp: 90 Capsule, Rfl: 1    traZODone (DESYREL) 50 MG Tab, Take 1 Tablet by mouth every evening., Disp: 90 Tablet, Rfl: 1  ALLERGIES:   Allergies   Allergen Reactions    Amoxicillin Hives    Doxycycline Itching     SURGHX: No past surgical history on file.  SOCHX:  reports that she has never smoked. She has never used smokeless tobacco. She reports that she does not currently use alcohol. She reports that she does not currently use drugs.  FH: family history includes Arthritis in her mother; Cancer in her maternal grandmother; Diabetes in her maternal grandmother; Heart Disease in her maternal grandmother; Hyperlipidemia in her maternal grandmother; Hypertension in her maternal grandmother; No Known Problems in her brother, father, and sister; Other in her mother.      Review of Systems   Constitutional:  Positive for malaise/fatigue. Negative for chills and fever.   HENT: Negative.     Eyes: Negative.    Cardiovascular: Negative.    Gastrointestinal:  Positive for nausea. Negative for blood in stool, diarrhea and vomiting.   Genitourinary: Negative.    Musculoskeletal: Negative.    Skin: Negative.    Neurological:  Positive for headaches. Negative for dizziness.   Psychiatric/Behavioral: Negative.              Objective     /68 (BP Location: Left arm, Patient Position: Sitting, BP Cuff Size: Adult)   Pulse 78   Temp 36.1 °C (97 °F) (Temporal)   Resp 20   Ht 1.702 m (5' 7\")   " Wt 83 kg (183 lb)   LMP 01/15/2023 Comment: pregnancy not yet verified with MD  SpO2 97%   BMI 28.66 kg/m²      Physical Exam  Vitals and nursing note reviewed.   Constitutional:       General: She is not in acute distress.     Appearance: Normal appearance. She is well-developed. She is not ill-appearing, toxic-appearing or diaphoretic.   HENT:      Head: Normocephalic and atraumatic.      Right Ear: Tympanic membrane, ear canal and external ear normal.      Left Ear: Tympanic membrane, ear canal and external ear normal.      Nose: Nose normal. No congestion or rhinorrhea.      Mouth/Throat:      Mouth: Mucous membranes are moist.      Pharynx: No oropharyngeal exudate or posterior oropharyngeal erythema.   Eyes:      General:         Right eye: No discharge.         Left eye: No discharge.      Conjunctiva/sclera: Conjunctivae normal.   Cardiovascular:      Rate and Rhythm: Normal rate and regular rhythm.      Pulses: Normal pulses.      Heart sounds: Normal heart sounds.   Pulmonary:      Effort: Pulmonary effort is normal. No respiratory distress.      Breath sounds: Normal breath sounds. No wheezing, rhonchi or rales.   Abdominal:      General: Abdomen is flat. There is no distension.      Palpations: Abdomen is soft.      Tenderness: There is no abdominal tenderness. There is no right CVA tenderness, left CVA tenderness, guarding or rebound.   Musculoskeletal:      Cervical back: Normal range of motion and neck supple.      Right lower leg: No edema.      Left lower leg: No edema.   Lymphadenopathy:      Cervical: No cervical adenopathy.   Skin:     General: Skin is warm and dry.      Capillary Refill: Capillary refill takes 2 to 3 seconds.      Coloration: Skin is pale.   Neurological:      General: No focal deficit present.      Mental Status: She is alert and oriented to person, place, and time. Mental status is at baseline.   Psychiatric:         Mood and Affect: Mood normal.         Behavior: Behavior  normal.         Thought Content: Thought content normal.         Judgment: Judgment normal.                           Assessment & Plan     This is a very pleasant 22-year-old female accompanied by her mother for evaluation of fatigue, headache and nausea for 1 week.  Patient is currently 13 weeks pregnant.  Prenatal care has been routine and normal to this point.  Patient has no pertinent past medical history including thyroid, anemia or diabetes.  She notes that she has been feeling some weakness and lightheadedness when standing too long at work.  Patient denies fever, cough, shortness of breath, wheezing, chest pain, leg swelling or calf tenderness.  No abdominal pain, vaginal bleeding, vaginal discharge.  History of vitamin D deficiency but has been taking her supplements.  Patient notes her fluid intake has been adequate but her caloric intake has been poor at best.  She reports to decreased appetite secondary to nausea.  She has been eating smaller amounts, less frequently and not making healthy dietary choices.  She was recently treated with clindamycin for group B strep UTI.  Patient vital signs are normal.  She does have mildly decreased cap refill and does appear pale but no diaphoresis or toxic appearance.  Abdominal exam benign.  Lungs are clear bilaterally without wheezing rhonchi or rails.  Neurological exam at baseline.  Urinalysis shows no residual infection.  CBC and CMP normal showing no signs of anemia, infection, electrolyte imbalance, dehydration, kidney or liver dysfunction.  Patient will continue to aggressively oral hydrate, take her prenatal vitamins.  She will begin consuming several small protein packed meals a day to ensure proper caloric intake.  ER and red flag symptoms discussed at length with patient.  She will call her OB tomorrow for further guidance.  Did advise patient that it is safe to take Tylenol for headache.    1. 13 weeks gestation of pregnancy  POCT Urinalysis      2.  Pregnancy related fatigue in second trimester  CBC WITHOUT DIFFERENTIAL    Comp Metabolic Panel    CANCELED: POCT Glucose      3. Acute nonintractable headache, unspecified headache type  CBC WITHOUT DIFFERENTIAL    Comp Metabolic Panel          Return to clinic or go to ED if symptoms worsen or persist. Red flag symptoms and indications for ED discussed at length. Patient/Parent/Guardian voices understanding. Follow-up with your primary care provider in 3-5 days. All side effects and potential interactions of prescribed medication discussed including allergic response, GI upset, tendon injury, rash, sedation, OCP effectiveness, etc.    Please note that this dictation was created using voice recognition software. I have made every reasonable attempt to correct obvious errors, but I expect that there are errors of grammar and possibly content that I did not discover before finalizing the note.

## 2023-04-23 NOTE — ED TRIAGE NOTES
"Chief Complaint   Patient presents with    Headache    Pregnancy     13 weeks     Pt reports that she has been having \"migraines and nearly blacking out.\" Reports Hx syncope. No current symptoms. Has not seen BRAIN Mejia for this new problem.  /70   Pulse 80   Temp 36.1 °C (97 °F) (Temporal)   Resp 18   Wt 83.2 kg (183 lb 6.8 oz)   SpO2 99%   Pt informed of wait times. Educated on triage process.  Asked to return to triage RN for any new or worsening of symptoms. Thanked for patience.      "

## 2023-04-23 NOTE — ED NOTES
Pt came to front triage desk and expressed the desire to leave without being seen. It was explained to the pt that he can come back anytime if the pain continues or gets worse. Pt signed the ER AMA Consent form and their wrist band was cut off. Pt ambulated out of the ER of their own.

## 2023-07-26 ENCOUNTER — HOSPITAL ENCOUNTER (EMERGENCY)
Facility: MEDICAL CENTER | Age: 23
End: 2023-07-26
Attending: OBSTETRICS & GYNECOLOGY | Admitting: OBSTETRICS & GYNECOLOGY
Payer: COMMERCIAL

## 2023-07-26 VITALS
WEIGHT: 186 LBS | DIASTOLIC BLOOD PRESSURE: 66 MMHG | TEMPERATURE: 97.5 F | HEIGHT: 67 IN | OXYGEN SATURATION: 99 % | BODY MASS INDEX: 29.19 KG/M2 | RESPIRATION RATE: 16 BRPM | HEART RATE: 96 BPM | SYSTOLIC BLOOD PRESSURE: 120 MMHG

## 2023-07-26 LAB
APPEARANCE UR: CLEAR
COLOR UR AUTO: YELLOW
GLUCOSE UR QL STRIP.AUTO: NEGATIVE MG/DL
KETONES UR QL STRIP.AUTO: NEGATIVE MG/DL
LEUKOCYTE ESTERASE UR QL STRIP.AUTO: ABNORMAL
NITRITE UR QL STRIP.AUTO: NEGATIVE
PH UR STRIP.AUTO: 6 [PH] (ref 5–8)
PROT UR QL STRIP: NEGATIVE MG/DL
RBC UR QL AUTO: NEGATIVE
SP GR UR STRIP.AUTO: <=1.005 (ref 1–1.03)

## 2023-07-26 PROCEDURE — 302449 STATCHG TRIAGE ONLY (STATISTIC)

## 2023-07-26 PROCEDURE — 81002 URINALYSIS NONAUTO W/O SCOPE: CPT

## 2023-07-26 ASSESSMENT — PAIN SCALES - GENERAL: PAINLEVEL: 0 - NO PAIN

## 2023-07-26 ASSESSMENT — FIBROSIS 4 INDEX: FIB4 SCORE: 0.32

## 2023-07-27 NOTE — PROGRESS NOTES
1900: Report received from Mary JAMESON. Assumed care of pt.    1910: Dr. Mejia at bedside discussing POC with patient. SSE performed. SVE cl/th/high by Dr. Mejia. All questions and concerns answered,  orders received to discharge home.    1930: Patient discharged home with specific instruction to return to L&D/Physician ie.. Bleeding/ROM/decreased FM/labor/concerns for self or baby.  Patient denies questions or concerns regarding POC since arrival and POC to discharge home.  Patient ambulated out of hospital with her mother by side.

## 2023-07-27 NOTE — PROGRESS NOTES
"Department of Obstetrics and Gynecology  Labor and Delivery History and Physical  OB ED Note    Date of Admission: 2023     ID: 22 y.o.  with IUP at 27w3d. JALEN 10/22/23    Primary OB: Leydi Mejia M.D.     Attending OB: Leydi Mejia M.D.     CC: I'm having diarrhea and cramping    HPI: Mackenzie Daniels is a 22 y.o.  at 27w3d; Pt of mine. Pregnancy has been uncomplicated.  Reports during lunch today got some intense abdominal cramping followed by multiple bouts of watery diarrhea. Had a  hot flash at onset but denies feeling feverish.  Some nausea but took zofran. No vomitting. No sick contacts. Baby moving well. Denies leaking, bleeding.      ROS: 10 systems reviewed and negative except as noted above.    Obstetric History    - current        Past Medical History  Surgical History   none   none      Gynecologic History  Social History   reg menses prior to pregnancy    denies Hx of STIs Tobacco: no  EtOH: no  Street Drugs: no      Medications  Allergies   No current facility-administered medications on file prior to encounter.     Current Outpatient Medications on File Prior to Encounter   Medication Sig Dispense Refill    ondansetron (ZOFRAN ODT) 4 MG TABLET DISPERSIBLE Take 1 Tablet by mouth every 8 hours as needed for Nausea/Vomiting. 9 Tablet 0    fluoxetine (PROZAC) 40 MG capsule Take 1 Capsule by mouth every day. 90 Capsule 1    traZODone (DESYREL) 50 MG Tab Take 1 Tablet by mouth every evening. 90 Tablet 1    Amoxicillin and Doxycycline       O: /66   Pulse 96   Temp 36.4 °C (97.5 °F) (Temporal)   Resp 16   Ht 1.702 m (5' 7\")   Wt 84.4 kg (186 lb)   LMP 01/15/2023 Comment: pregnancy not yet verified with MD  SpO2 99%   BMI 29.13 kg/m²     Gen: NAD, AAO  Abd: Gravid, NTTP,Cephalic by [place], No rebound or guarding  Ext: NTTP, no edema, 2+DPP  Pelvic: SVE with spec closed; manually closed too    FHT:  baseline 130's with moderate variability, accels present, no " decels  [place]: CTX rare    Labs:   Hospital Outpatient Visit on 2023   Component Date Value Ref Range Status    Sodium 2023 137  135 - 145 mmol/L Final    Potassium 2023 4.1  3.6 - 5.5 mmol/L Final    Chloride 2023 104  96 - 112 mmol/L Final    Co2 2023 20  20 - 33 mmol/L Final    Anion Gap 2023 13.0  7.0 - 16.0 Final    Glucose 2023 86  65 - 99 mg/dL Final    Bun 2023 4 (L)  8 - 22 mg/dL Final    Creatinine 2023 0.43 (L)  0.50 - 1.40 mg/dL Final    Calcium 2023 9.0  8.5 - 10.5 mg/dL Final    AST(SGOT) 2023 13  12 - 45 U/L Final    ALT(SGPT) 2023 11  2 - 50 U/L Final    Alkaline Phosphatase 2023 41  30 - 99 U/L Final    Total Bilirubin 2023 0.3  0.1 - 1.5 mg/dL Final    Albumin 2023 4.3  3.2 - 4.9 g/dL Final    Total Protein 2023 7.1  6.0 - 8.2 g/dL Final    Globulin 2023 2.8  1.9 - 3.5 g/dL Final    A-G Ratio 2023 1.5  g/dL Final    WBC 2023 7.1  4.8 - 10.8 K/uL Final    RBC 2023 4.31  4.20 - 5.40 M/uL Final    Hemoglobin 2023 12.7  12.0 - 16.0 g/dL Final    Hematocrit 2023 36.9 (L)  37.0 - 47.0 % Final    MCV 2023 85.6  81.4 - 97.8 fL Final    MCH 2023 29.5  27.0 - 33.0 pg Final    MCHC 2023 34.4  33.6 - 35.0 g/dL Final    RDW 2023 43.6  35.9 - 50.0 fL Final    Platelet Count 2023 268  164 - 446 K/uL Final    MPV 2023 10.0  9.0 - 12.9 fL Final    Correct Calcium 2023 8.8  8.5 - 10.5 mg/dL Final    GFR (CKD-EPI) 2023 140  >60 mL/min/1.73 m 2 Final    Comment: Estimated Glomerular Filtration Rate is calculated using  race neutral CKD-EPI  equation per NKF-ASN recommendations.           A/P: Mackenzie Daniels is a 22 y.o.  at 27w3d here with some cramping and diarrhea. No evidence of  labor.   Instructed imodium okay. Also rest and hydration. Pt reassured. Will be discharged to home.        Leydi Mejia M.D.,   7/26/2023, 7:15 PM

## 2023-07-27 NOTE — PROGRESS NOTES
1800: Pt is a  at 27.3 presenting to OB triage c/o nausea and diarrhea since lunch. Pt states that she is feeling mild abdominal cramping. + FM and denies Ucs, VB, and LOF. EFM and TOCO applied. VS taken. Pt is resting comfortably in bed at this time.    1843: This RN telephoned Dr. Mejia and updated on pt status/FHT/VS/lab results. POC discussed.    : Report given to Michelle JAMESON. POC discussed. All questions have been answered at this time. Care relinquished.

## 2024-04-22 ENCOUNTER — APPOINTMENT (OUTPATIENT)
Dept: BEHAVIORAL HEALTH | Facility: CLINIC | Age: 24
End: 2024-04-22
Payer: COMMERCIAL

## 2024-05-22 ENCOUNTER — APPOINTMENT (OUTPATIENT)
Dept: BEHAVIORAL HEALTH | Facility: CLINIC | Age: 24
End: 2024-05-22
Payer: COMMERCIAL

## 2024-06-29 ENCOUNTER — APPOINTMENT (OUTPATIENT)
Dept: URGENT CARE | Facility: PHYSICIAN GROUP | Age: 24
End: 2024-06-29
Payer: COMMERCIAL

## 2025-03-29 ENCOUNTER — OFFICE VISIT (OUTPATIENT)
Dept: URGENT CARE | Facility: PHYSICIAN GROUP | Age: 25
End: 2025-03-29
Payer: COMMERCIAL

## 2025-03-29 ENCOUNTER — HOSPITAL ENCOUNTER (OUTPATIENT)
Dept: RADIOLOGY | Facility: MEDICAL CENTER | Age: 25
End: 2025-03-29
Payer: COMMERCIAL

## 2025-03-29 VITALS
SYSTOLIC BLOOD PRESSURE: 124 MMHG | TEMPERATURE: 97.7 F | RESPIRATION RATE: 20 BRPM | OXYGEN SATURATION: 97 % | DIASTOLIC BLOOD PRESSURE: 72 MMHG | HEART RATE: 90 BPM | HEIGHT: 67 IN | BODY MASS INDEX: 31.61 KG/M2 | WEIGHT: 201.4 LBS

## 2025-03-29 DIAGNOSIS — R06.2 WHEEZING: ICD-10-CM

## 2025-03-29 DIAGNOSIS — B96.89 BACTERIAL SINUSITIS: ICD-10-CM

## 2025-03-29 DIAGNOSIS — J32.9 BACTERIAL SINUSITIS: ICD-10-CM

## 2025-03-29 DIAGNOSIS — J40 BRONCHITIS: ICD-10-CM

## 2025-03-29 DIAGNOSIS — R05.1 ACUTE COUGH: ICD-10-CM

## 2025-03-29 PROCEDURE — 71046 X-RAY EXAM CHEST 2 VIEWS: CPT

## 2025-03-29 RX ORDER — AZITHROMYCIN 250 MG/1
TABLET, FILM COATED ORAL
Qty: 6 TABLET | Refills: 0 | Status: SHIPPED | OUTPATIENT
Start: 2025-03-29

## 2025-03-29 RX ORDER — PREDNISONE 20 MG/1
40 TABLET ORAL DAILY
Qty: 10 TABLET | Refills: 0 | Status: SHIPPED | OUTPATIENT
Start: 2025-03-29 | End: 2025-04-03

## 2025-03-29 RX ORDER — ALBUTEROL SULFATE 90 UG/1
2 INHALANT RESPIRATORY (INHALATION) EVERY 6 HOURS PRN
Qty: 8.5 G | Refills: 0 | Status: SHIPPED | OUTPATIENT
Start: 2025-03-29

## 2025-03-29 RX ORDER — ALBUTEROL SULFATE 0.83 MG/ML
2.5 SOLUTION RESPIRATORY (INHALATION) ONCE
Status: COMPLETED | OUTPATIENT
Start: 2025-03-29 | End: 2025-03-29

## 2025-03-29 RX ADMIN — ALBUTEROL SULFATE 2.5 MG: 0.83 SOLUTION RESPIRATORY (INHALATION) at 11:19

## 2025-03-29 ASSESSMENT — FIBROSIS 4 INDEX: FIB4 SCORE: 0.35

## 2025-03-29 NOTE — PROGRESS NOTES
Subjective:   Mackenzie Daniels is a 24 y.o. female who presents for Cough (X2 weeks with wheezing, bloody phlegm, and chest pain/pressure when coughing.)      HPI:    Chief Complaint  Cough for 2 weeks, wheezing, bloody phlegm, chest pain/pressure when coughing, congestion, throat pain from coughing, fatigue, shortness of breath    History of Present Illness  A 24-year-old female presents to urgent care with a 2-week history of cough, wheezing, bloody phlegm, and chest pain/pressure when coughing. The patient has a history of using an inhaler in 2017 for a cold-related condition.    The patient reports that her symptoms began with fever, chills, and a dry, bloody cough approximately two weeks ago. Although she no longer has a fever, she continues to experience congestion, throat pain from coughing, fatigue, and shortness of breath. The chest pain and pressure are specifically associated with coughing episodes. She also notes wheezing. The patient denies ear pain, nausea, or vomiting.    The cough has been productive, with the patient reporting bloody phlegm. The persistent symptoms have led to fatigue, impacting her daily functioning. She has not required any recent healthcare interactions for these symptoms prior to this visit.    The patient reports no chance of pregnancy. She has allergies to amoxicillin and doxycycline, which is relevant to her current treatment options.    Medical History  - History of using an inhaler in 2017 for a cold-related condition      Allergies  - Amoxicillin (nature of reaction not specified)  - Doxycycline (nature of reaction not specified)      ROS As above in HPI    Medications:    Current Outpatient Medications on File Prior to Visit   Medication Sig Dispense Refill    Etonogestrel (NEXPLANON SC) Inject  under the skin.      fluoxetine (PROZAC) 40 MG capsule Take 1 Capsule by mouth every day. 90 Capsule 1     No current facility-administered medications on file prior to visit.  "       Allergies:   Amoxicillin and Doxycycline    Problem List:   Patient Active Problem List   Diagnosis    Dysmenorrhea    Anxiety    Depression    Mixed hyperlipidemia    Vitamin D deficiency    BMI 27.0-27.9,adult    PTSD (post-traumatic stress disorder)    Major depressive disorder with single episode, in partial remission ([facility])        Surgical History:  No past surgical history on file.    Past Social Hx:   Social History     Tobacco Use    Smoking status: Never    Smokeless tobacco: Never   Vaping Use    Vaping status: Never Used   Substance Use Topics    Alcohol use: Not Currently     Comment: rare    Drug use: Yes     Types: Inhaled, Marijuana          Problem list, medications, and allergies reviewed by myself today in Epic.     Objective:     /72   Pulse 90   Temp 36.5 °C (97.7 °F) (Temporal)   Resp 20   Ht 1.702 m (5' 7\")   Wt 91.4 kg (201 lb 6.4 oz)   SpO2 97%   BMI 31.54 kg/m²     Physical Exam  Vitals and nursing note reviewed.   Constitutional:       General: She is not in acute distress.     Appearance: Normal appearance. She is not ill-appearing.   HENT:      Head: Normocephalic.      Right Ear: Ear canal normal. A middle ear effusion is present. Tympanic membrane is injected.      Left Ear: Ear canal normal. A middle ear effusion is present. Tympanic membrane is injected.      Nose: Mucosal edema, congestion and rhinorrhea present. Rhinorrhea is clear and purulent.      Right Sinus: Maxillary sinus tenderness present. No frontal sinus tenderness.      Left Sinus: Maxillary sinus tenderness present. No frontal sinus tenderness.      Mouth/Throat:      Mouth: Mucous membranes are moist.      Pharynx: Oropharynx is clear. Uvula midline. Posterior oropharyngeal erythema and postnasal drip present. No pharyngeal swelling, oropharyngeal exudate or uvula swelling.      Tonsils: No tonsillar exudate or tonsillar abscesses.   Cardiovascular:      Rate and Rhythm: Normal rate and regular " rhythm.      Heart sounds: Normal heart sounds. No murmur heard.     No friction rub. No gallop.   Pulmonary:      Effort: Pulmonary effort is normal. No respiratory distress.      Breath sounds: No stridor. Examination of the right-middle field reveals wheezing. Examination of the right-lower field reveals decreased breath sounds and wheezing. Examination of the left-lower field reveals decreased breath sounds. Decreased breath sounds and wheezing present. No rhonchi or rales.   Chest:      Chest wall: No tenderness.   Abdominal:      General: Bowel sounds are normal.      Palpations: Abdomen is soft.   Musculoskeletal:      Cervical back: No rigidity or tenderness.   Lymphadenopathy:      Cervical: No cervical adenopathy.   Skin:     General: Skin is warm and dry.      Capillary Refill: Capillary refill takes less than 2 seconds.      Findings: No rash.   Neurological:      Mental Status: She is alert and oriented to person, place, and time.         Assessment/Plan:     DX-CHEST-2 VIEWS 03/29/2025    Narrative  3/29/2025 11:19 AM    HISTORY/REASON FOR EXAM:  Cough.    TECHNIQUE/EXAM DESCRIPTION AND NUMBER OF VIEWS:  Two views of the chest.    COMPARISON:  Chest radiography, 11/9/2013.    FINDINGS:  The cardiomediastinal silhouette is normal in size.  No pulmonary infiltrates or consolidations are noted.  No pleural effusions are appreciated.  No visible pneumothorax.    Impression  No radiographic evidence of acute cardiopulmonary disease.      Diagnosis and associated orders:   1. Acute cough  - DX-CHEST-2 VIEWS; Future    2. Wheezing  - albuterol (Proventil) 2.5mg/3ml nebulizer solution 2.5 mg    3. Bacterial sinusitis  - azithromycin (ZITHROMAX) 250 MG Tab; Take 2 tablets by mouth on day one. Take one tablet by mouth the remaining days until gone  Dispense: 6 Tablet; Refill: 0    4. Bronchitis  - predniSONE (DELTASONE) 20 MG Tab; Take 2 Tablets by mouth every day for 5 days.  Dispense: 10 Tablet; Refill: 0  -  albuterol 108 (90 Base) MCG/ACT Aero Soln inhalation aerosol; Inhale 2 Puffs every 6 hours as needed for Shortness of Breath.  Dispense: 8.5 g; Refill: 0    Other orders  - Etonogestrel (NEXPLANON SC); Inject  under the skin.        Comments/MDM:       Acute bronchitis with possible sinus infection  Assessment: 24-year-old female presenting with a 2-week history of cough, wheezing, bloody phlegm, and chest pain/pressure when coughing. Initial symptoms included fever and chills, which have since resolved. Patient reports congestion, throat pain from coughing, fatigue, and shortness of breath. Physical exam revealed wheezing. Chest x-ray was clear. Patient has a history of using an inhaler in 2017 for a cold-related condition. Fluid noted behind both ears, suggesting a sinus infection. Differential diagnosis includes acute bronchitis and sinus infection.    Plan:  - Albuterol inhaler prescribed (patient improved slightly after in-office treatment)  - Oral steroids prescribed  - Azithromycin prescribed for possible sinus infection and bronchitis  - Advised to use steam and nasal rinses for symptom relief  - Return if symptoms worsen  - Medications to be sent to pharmacy    Follow up with PCP advised.     Return to clinic or go to ED if symptoms worsen or persist. Indications for ED discussed at length. Patient/Parent/Guardian voices understanding. Follow-up with your primary care provider in 3-5 days. Red flag symptoms discussed. All side effects of medication discussed including allergic response, GI upset, tendon injury, rash, sedation etc.    Please note that this dictation was created using voice recognition software. I have made a reasonable attempt to correct obvious errors, but I expect that there are errors of grammar and possibly content that I did not discover before finalizing the note.    This note was electronically signed by ODELL Rose